# Patient Record
Sex: FEMALE | Race: WHITE | NOT HISPANIC OR LATINO | URBAN - METROPOLITAN AREA
[De-identification: names, ages, dates, MRNs, and addresses within clinical notes are randomized per-mention and may not be internally consistent; named-entity substitution may affect disease eponyms.]

---

## 2022-09-01 ENCOUNTER — EMERGENCY (EMERGENCY)
Facility: HOSPITAL | Age: 87
LOS: 1 days | Discharge: HOME | End: 2022-09-01
Attending: STUDENT IN AN ORGANIZED HEALTH CARE EDUCATION/TRAINING PROGRAM | Admitting: STUDENT IN AN ORGANIZED HEALTH CARE EDUCATION/TRAINING PROGRAM
Payer: MEDICARE

## 2022-09-01 VITALS
TEMPERATURE: 98 F | RESPIRATION RATE: 16 BRPM | HEART RATE: 68 BPM | DIASTOLIC BLOOD PRESSURE: 74 MMHG | OXYGEN SATURATION: 98 % | SYSTOLIC BLOOD PRESSURE: 135 MMHG

## 2022-09-01 VITALS
RESPIRATION RATE: 16 BRPM | SYSTOLIC BLOOD PRESSURE: 158 MMHG | HEIGHT: 63 IN | OXYGEN SATURATION: 98 % | TEMPERATURE: 98 F | WEIGHT: 119.93 LBS | DIASTOLIC BLOOD PRESSURE: 74 MMHG | HEART RATE: 77 BPM

## 2022-09-01 PROCEDURE — 99284 EMERGENCY DEPT VISIT MOD MDM: CPT | Mod: 25

## 2022-09-01 PROCEDURE — 70486 CT MAXILLOFACIAL W/O DYE: CPT | Mod: MA

## 2022-09-01 PROCEDURE — G0168: CPT

## 2022-09-01 PROCEDURE — 12011 RPR F/E/E/N/L/M 2.5 CM/<: CPT

## 2022-09-01 PROCEDURE — 70450 CT HEAD/BRAIN W/O DYE: CPT | Mod: 26,MA

## 2022-09-01 PROCEDURE — 90715 TDAP VACCINE 7 YRS/> IM: CPT

## 2022-09-01 PROCEDURE — 70486 CT MAXILLOFACIAL W/O DYE: CPT | Mod: 26,MA

## 2022-09-01 PROCEDURE — 70450 CT HEAD/BRAIN W/O DYE: CPT | Mod: MA

## 2022-09-01 PROCEDURE — 90471 IMMUNIZATION ADMIN: CPT

## 2022-09-01 RX ORDER — TETANUS TOXOID, REDUCED DIPHTHERIA TOXOID AND ACELLULAR PERTUSSIS VACCINE, ADSORBED 5; 2.5; 8; 8; 2.5 [IU]/.5ML; [IU]/.5ML; UG/.5ML; UG/.5ML; UG/.5ML
0.5 SUSPENSION INTRAMUSCULAR ONCE
Refills: 0 | Status: COMPLETED | OUTPATIENT
Start: 2022-09-01 | End: 2022-09-01

## 2022-09-01 RX ORDER — ACETAMINOPHEN 500 MG
2 TABLET ORAL
Qty: 0 | Refills: 0 | DISCHARGE

## 2022-09-01 RX ADMIN — TETANUS TOXOID, REDUCED DIPHTHERIA TOXOID AND ACELLULAR PERTUSSIS VACCINE, ADSORBED 0.5 MILLILITER(S): 5; 2.5; 8; 8; 2.5 SUSPENSION INTRAMUSCULAR at 12:56

## 2022-09-01 NOTE — ED ADULT TRIAGE NOTE - BP NONINVASIVE SYSTOLIC (MM HG)
Physical Therapy  Visit Type: initial evaluation  Precautions:  Medical precautions:  fall risk;.   Lines:     Basic: IV, O2 and telemetry (2L, external female catheter removed)      Lines in chart and on patient reviewed, cautions maintained throughout session.  Safety Measures: bed alarm and bed rails      SUBJECTIVE                                                                                                            Patient agreed to participate in therapy this date.  \"I don't want to fall!\"  \"I'm dizzy.\"  Patient / Family Goal: return to previous functional status, maximize function and return home    Pain   RN informed on pain level      OBJECTIVE                                                                                                                HR at rest 90's -100's; with activity 110's-130's  Dizziness reported with increased anxiety and fear of falling, HR increases during these brief episodes and decreased with breathing techniques.   Level of consciousness: alert      Disoriented to situation    Affect/Behavior: calm and cooperative  Functional Communication/Cognition    Overall status:  Within functional limits  Range of Motion (measured in degrees unless otherwise noted, active unless indicated)  WFL: RLE, LLE  Strength (out of 5 unless otherwise indicated)   Hip:  Hip Flexion: Left: 3- Right: 3-  Knee:   - Extension:      • Left: 3-      • Right: 3-  Ankle:    - Dorsiflexion:      • Left: 2      • Right: 2   - Plantar Flexion:      • Left: 2      • Right: 2  Balance    Sitting: Static: contact guard/touching/steadying assist and supervision double upper extremity support, double lower extremity support and back unsupported    Standing - Firm Surface - Eyes Open: Standing static: deferred due to dizziness.  Sensation - Lower Extremity  L1 (back, over trochanter and groin):     Light Touch: Left: intact Right: intact  L2 (back, front of thigh to knee):     Light Touch: Left: intact Right:  intact  L3 (back, upper buttock, anterior thigh, knee, medial lower leg):     Light Touch: Left: intact Right: intact  L4 (medial buttock, lateral thigh, medial leg, dorsum of foot, great toe):     Light Touch: Left: intact Right: intact  L5 (buttock, posterior and lateral thigh, lateral aspect of leg, dorsum of foot, medial half of sole, 1-3rd toes):     Light Touch: Left: intact Right: intact   S1 (buttock, thigh, posterior leg):     Light Touch: Left: intact Right: intact  Bed Mobility:    Rolling left: set up    Repositioning in bed: moderate assist and with verbal cues    Supine to sit: minimal assist and with verbal cues    Sit to supine: moderate assist and with verbal cues  Training completed:    Tasks: rolling left, boosting, supine to sit and sit to supine    Education details: body mechanics, patient safety and patient requires additional training  Transfers:      Sit to stand: not attempted due to safety concerns    Slide board: moderate assist (scooting along EOB)        Interventions                                                                                                       Seated    Lower Extremity: Bilateral: seated hip flexion, toe raises, heel raises and knee extensions, AROM, 10 reps, 1 sets  Training provided: activity tolerance, balance retraining, bed mobility training, breathing/relaxation, compensatory techniques, positioning and safety training    Skilled input: Verbal instruction/cues, tactile instruction/cues and posture correction  Verbal Consent: Writer verbally educated and received verbal consent for hand placement, positioning of patient, and techniques to be performed today from patient for clothing adjustments for techniques, therapist position for techniques and hand placement and palpation for techniques as described above and how they are pertinent to the patient's plan of care.        ASSESSMENT                                                                                                                 Impairments: strength, balance deficits, safety awareness, pain, activity tolerance and shortness of breath  Functional Limitations: all functional mobility  Pt anxiety and tachycardia seemed to go hand in hand, both calm with breathing exercises. With dangling pt reported fear of falling and dizziness. 's -130's at EOB, 90's-100's at rest. Will follow to progress as able. RN notified.       Discharge Recommendations   Recommendation for Discharge: PT IL: Patient needs daily, skilled therapy for 1-3 hours a day by at least two disciplines        PT/OT Mobility Equipment for Discharge: tbd      PT Identified Barriers to Discharge: dizziness, weakness, pain     Therapy Diagnosis:  Other Abnormalities of Gait and Mobility  Skilled therapy is required to address these limitations in attempt to maximize the patient's independence.  Predicted patient presentation: Moderate (evolving) - Patient comorbidities and complexities, as defined above, may have varying impact on steady progress for prescribed plan of care.    End of Session:   Location: in bed  Safety measures: alarm system in place/re-engaged, bed rails x3, call light within reach, equipment intact and lines intact  Handoff to: nurse            PLAN                                                                                                                            Suggestions for next session as indicated: PT Frequency: 3 days/week  A minimum of 8 minutes per session x 1 week.  Interventions: balance, bed mobility, body mechanics, compensatory technique education, energy conservation, functional transfer training, gait training, HEP train/position, patient/family training, safety education, stairs retraining and strengthening  Agreement to plan and goals: patient agrees with goals and treatment plan        GOALS:  Review Date: 10/21/2020  Long Term Goals: (to be met by time of discharge from hospital)  Sit to supine:  Patient will complete sit to supine set up.  Supine to sit: Patient will complete supine to sit set up.    Pt to demonstrate safe transfers OOB with AD and assist in order to progress to baseline mobility.Documented in the chart in the following areas: Prior Level of Function. Pain. Assessment. Patient Education.         158

## 2022-09-01 NOTE — ED PROVIDER NOTE - NSFOLLOWUPINSTRUCTIONS_ED_ALL_ED_FT
Please follow up with your Primary Care Physician and any specialists as discussed.  Please take your medications as prescribed.  If your symptoms persist or worsen, please seek care. Either return to the Emergency Department, go to urgent care or see your primary care doctor.  Please refer to general information and instructions below:       Skin Adhesive Care    WHAT YOU NEED TO KNOW:    Skin adhesive is medical glue used to close wounds. It is a substitute for staples and stitches. Skin adhesive wound closures take less time and do not require anesthesia. You have less pain and a lower risk of infection than with staples or stitches. Skin adhesive will fall off after the wound is healed.     DISCHARGE INSTRUCTIONS:    Self-care:   •Keep your wound clean and dry for 1 to 5 days. You can shower 24 hours after the skin adhesive is applied. Lightly pat your wound dry after you shower.      •Do not soak your wound in water, such as in a bath or hot tub.      •Do not scrub your wound or pick at the adhesive. This can make your wound reopen.       •Do not apply ointments to your wound. These include antibiotic and other ointments that contain petroleum jelly. These products will remove skin adhesive and reopen your wound.       Follow up with your doctor as directed: Write down your questions so you remember to ask them during your visits.    Contact your healthcare provider if:   •You have a fever.       •Your wound is red and warm to touch.       •You have questions or concerns about your condition or care.       Return to the emergency department if:   •Your wound has fluid draining from it.       •Your wound opens.

## 2022-09-01 NOTE — ED ADULT NURSE NOTE - NSIMPLEMENTINTERV_GEN_ALL_ED
Implemented All Fall with Harm Risk Interventions:  [place] to call system. Call bell, personal items and telephone within reach. Instruct patient to call for assistance. Room bathroom lighting operational. Non-slip footwear when patient is off stretcher. Physically safe environment: no spills, clutter or unnecessary equipment. Stretcher in lowest position, wheels locked, appropriate side rails in place. Provide visual cue, wrist band, yellow gown, etc. Monitor gait and stability. Monitor for mental status changes and reorient to person, place, and time. Review medications for side effects contributing to fall risk. Reinforce activity limits and safety measures with patient and family. Provide visual clues: red socks.

## 2022-09-01 NOTE — ED ADULT NURSE NOTE - CHIEF COMPLAINT QUOTE
Patient alert, BIBA from [facility] s/p trip and fall. Denies LOC. On ASA 81mg daily. Forehead laceration noted and ecchymosis under left eye.

## 2022-09-01 NOTE — ED PROVIDER NOTE - PATIENT PORTAL LINK FT
You can access the FollowMyHealth Patient Portal offered by [facility] by registering at the following website: [email or web address]. By joining [facility]’s FollowMyHealth portal, you will also be able to view your health information using other applications (apps) compatible with our system.

## 2022-09-01 NOTE — ED PROVIDER NOTE - PHYSICAL EXAMINATION
Vital signs as available reviewed.  General:  Comfortable, no acute distress.  Head:  Normocephalic, atraumatic.  Eyes:  Conjunctiva pink, no icterus. Pupils equal, round, reactive to light, extra-occular eye movements intact.  Cardiovascular:  Regular rate, no obvious murmur.  Respiratory:  Clear to auscultation, good air entry bilaterally.  Abdomen:  Soft, non-tender.  Musculoskeletal:  No tenderness to palpation over c/t/l spine. No tenderness to palpation over chest wall, clavicles, shoulders, upper/lower arms, hip/pelvis, upper / lower legs.  Neurologic: Alert and oriented, moving all extremities. Sensation intact.  Skin:  Warm and dry. healing laceration with sutures in place to left shin. 0.5 laceration to left forehead.

## 2022-09-01 NOTE — ED ADULT TRIAGE NOTE - CHIEF COMPLAINT QUOTE
Patient alert, BIBA from [place] [facility] s/p trip and fall. Denies LOC. On ASA 81mg daily. Forehead laceration noted and ecchymosis under left eye.

## 2022-09-01 NOTE — ED PROVIDER NOTE - NS ED ROS FT
Constitutional: No reported recent fever.  Neurological: No reported acute headache.  Eyes: No reported new vision changes.   Ears, Nose, Mouth, Throat: No reported acute sore throat.  Cardiovascular: No reported current chest pain.  Respiratory: No reported new shortness of breath.  Gastrointestinal: No reported vomiting.  Genitourinary: No reported new urinary problems.  Musculoskeletal: No reported acute extremity pain.  Integumentary (skin and/or breast): + laceration

## 2022-09-01 NOTE — ED PROVIDER NOTE - OBJECTIVE STATEMENT
87-year-old female history of hypertension, hyperlipidemia, dementia status post fall.  Patient was at a petting zoo put on by her nursing home ([facility]) when she tripped and fell.  Patient on aspirin.  Patient denies headache, nausea and vomiting, chest pain, shortness of breath.  Patient ambulatory after the incident.

## 2022-09-01 NOTE — ED ADULT NURSE NOTE - COVID-19 RESULT
Called for refills on Levothyroxine & atorvastatin, I see that there are pending lab orders in so I didn't know if he needed labs first or not?  
We can refill, but I don't know why he just did the stool cultures, but not the blood work so do call and tell him to come for the blood work. He usually is good for that. Can refill with #5 refills. Thanks.   
We can refill, but I don't know why he just did the stool cultures, but not the blood work so do call and tell him to come for the blood work. He usually is good for that. Can refill with #5 refills. Thanks.       Refilled as ordered,attempted to contact,voice mailbox is not set up,will attempt again later.      Letter mailed that he needs labs completed due to being unable to reach by phone.  
NEGATIVE

## 2022-09-01 NOTE — ED ADULT NURSE NOTE - OBJECTIVE STATEMENT
n/a
Presents to ER S/P fall.  Pt is pleasantly confused, but does recall falling.  Denies any pain. Lac to left side of forehead. Bruise under left eye.  EMS placed pt in neck collar.

## 2023-01-25 NOTE — ED PROVIDER NOTE - NSTOBACCOUNKNOWNSMK_GEN_A_CORE_RD
- Arrange for expedited RHC , possible leave in [place] with ICU admission for HF exacerbation. Cognitively Impaired

## 2024-02-11 ENCOUNTER — EMERGENCY (EMERGENCY)
Facility: HOSPITAL | Age: 89
LOS: 1 days | Discharge: NURSING, REHAB OR LONG-TERM CARE | End: 2024-02-11
Attending: EMERGENCY MEDICINE | Admitting: EMERGENCY MEDICINE
Payer: MEDICARE

## 2024-02-11 VITALS
OXYGEN SATURATION: 97 % | WEIGHT: 121.92 LBS | HEART RATE: 60 BPM | DIASTOLIC BLOOD PRESSURE: 87 MMHG | HEIGHT: 65 IN | SYSTOLIC BLOOD PRESSURE: 159 MMHG | RESPIRATION RATE: 20 BRPM | TEMPERATURE: 98 F

## 2024-02-11 VITALS
TEMPERATURE: 98 F | DIASTOLIC BLOOD PRESSURE: 75 MMHG | HEART RATE: 61 BPM | OXYGEN SATURATION: 97 % | RESPIRATION RATE: 18 BRPM | SYSTOLIC BLOOD PRESSURE: 148 MMHG

## 2024-02-11 PROBLEM — F03.90 UNSPECIFIED DEMENTIA WITHOUT BEHAVIORAL DISTURBANCE: Chronic | Status: ACTIVE | Noted: 2022-09-01

## 2024-02-11 PROBLEM — I10 ESSENTIAL (PRIMARY) HYPERTENSION: Chronic | Status: ACTIVE | Noted: 2022-09-01

## 2024-02-11 PROBLEM — E78.5 HYPERLIPIDEMIA, UNSPECIFIED: Chronic | Status: ACTIVE | Noted: 2022-09-01

## 2024-02-11 PROCEDURE — 99283 EMERGENCY DEPT VISIT LOW MDM: CPT | Mod: FS

## 2024-02-11 PROCEDURE — 99282 EMERGENCY DEPT VISIT SF MDM: CPT

## 2024-02-11 NOTE — ED PROVIDER NOTE - NSFOLLOWUPINSTRUCTIONS_ED_ALL_ED_FT
Follow-up with your PCP for reevaluation, ongoing care and treatment.  If having worsening symptoms, difficulty breathing, unable to swallow liquids or other related symptoms, return to the ER immediately.    Choking, Adult  Choking occurs when a food or object gets stuck in the throat or windpipe (trachea) and blocks the airway. If the airway is partly blocked, coughing will usually cause the food or object to come out. If the airway is completely blocked, immediate action is needed to make it come out.    A completely blocked airway can lead to respiratory failure and even death if untreated. The kind of treatment needed depends on the severity of the choking.    Signs of choking  A person with a partial airway blockage may be able to talk and cough.  A person with a complete airway blockage may:  Hold their neck with both hands. This is considered a universal sign of choking.  Be unable to breathe.  Make soft or high-pitched sounds while breathing.  Be unable to cough or cough weakly, ineffectively, or silently.  Be unable to cry, speak, or make sounds.  Turn blue or gray.  For partial airway blockage  If a person has a partial airway blockage and is coughing and able to speak:  Do not interfere. Allow coughing to clear the airway.  Do not let them try to drink until the food or object comes out.  Stay with the person until the food or object comes out. Watch for signs of complete airway blockage. If the person shows signs of complete airway blockage, take action to help them.  For complete airway blockage  A person doing abdominal thrusts on someone who is choking. Close-up shows how to press in and up with the hands.  If a person has a complete airway blockage, their life is in danger. A complete airway blockage causes breathing to stop. It is a medical emergency that requires fast and appropriate action by anyone who is available.    Do abdominal thrusts to save a person who is choking. Abdominal thrusts use pressure to force air from the abdomen into the throat to move the blockage.    CPR for an unconscious person  If the choking person is not breathing and either collapses or is found on the ground:  Shout for help.  If someone responds, tell that person to call 911 and look for an automated external defibrillator (AED).  If no one responds, begin 2 minutes of CPR.  Make sure the person is lying on a firm, flat surface and is facing up. Begin CPR, starting with 30 chest compressions and 2 breaths. Every time you open the airway to give rescue breaths, open the person's mouth. If you can see the food or object and it can be easily pulled out, remove it with your fingers. Do not try to remove the food or object if you cannot see it. This could push it farther into the airway.  After 5 cycles or 2 minutes of CPR, call local emergency services if a call has not already been made.  Continue CPR until the person starts breathing or until help arrives.  If you are choking:  A person using a chair to do abdominal thrusts.  Call 911. Do not worry about communicating what is happening. Do not hang up the phone. Someone may be sent to help you anyway.  Do abdominal thrusts on yourself. To do this, hold a fist against your abdomen and bend over a hard surface, such as a chair. Forcefully push your fist in and up until the food or object comes out.  Prevention  Chew food thoroughly.  Know that older adults are at an increased risk of choking. They should chew smaller bites and cut their food into smaller portions.  Avoid talking or laughing while you are chewing and swallowing.  Be prepared for choking situations. Take a certified first-aid course to learn how to correctly do abdominal thrusts.    Contact a health care provider if:  You have trouble swallowing food.  You continue to have drooling after choking stops.  You continue to have chest pain after choking stops.  Get help right away if:  You have problems breathing after choking stops.  You are confused or keep feeling drowsy.  You have lost consciousness at any point.  You were given CPR.  These symptoms may be an emergency. Get help right away. Call 911.  Do not wait to see if the symptoms will go away.  Do not drive yourself to the hospital.  This information is not intended to replace advice given to you by your health care provider. Make sure you discuss any questions you have with your health care provider.

## 2024-02-11 NOTE — ED PROVIDER NOTE - ENMT, MLM
Airway patent. Mouth with normal mucosa. Throat has no vesicles, no oropharyngeal exudates and uvula is midline. Swallowing secretions and water without difficulty now, no stridor or drooling noted.

## 2024-02-11 NOTE — ED PROVIDER NOTE - PATIENT PORTAL LINK FT
You can access the FollowMyHealth Patient Portal offered by [facility] by registering at the following website: [email or web address]. By joining [employer]’s FollowMyHealth portal, you will also be able to view your health information using other applications (apps) compatible with our system.

## 2024-02-11 NOTE — ED PROVIDER NOTE - PROGRESS NOTE DETAILS
pt swallowed a whole glass of water in ED without any difficulty. will dc back to facility. no further intervention warranted at this time.

## 2024-02-11 NOTE — ED PROVIDER NOTE - CLINICAL SUMMARY MEDICAL DECISION MAKING FREE TEXT BOX
88-year-old female with dementia brought from [facility] for evaluation of choking episode after she swallowed a hard candy.  Patient states she feels fine and is unable to give further history.    Physical exam vital signs stable afebrile no distress.  Oral airway is clear.  There is no stridor.  Lungs are clear.  Heart S1-S2 regular rhythm.  Abdomen soft nontender no mass or HSM.  Extremities full range of motion intact no edema.  Neuro intact confused but pleasant and cooperative.  No focal deficits.  Impression is choking spell which has resolved.  Patient drink full glass of water in ER without difficulty.  No indication for further intervention.  Plan is to discharge back to [facility].

## 2024-02-11 NOTE — ED ADULT NURSE NOTE - NSFALLHARMRISKINTERV_ED_ALL_ED
Assistance OOB with selected safe patient handling equipment if applicable/Communicate risk of Fall with Harm to all staff, patient, and family/Monitor gait and stability/Monitor for mental status changes and reorient to person, place, and time, as needed/Move patient closer to nursing station/within visual sight of ED staff/Provide patient with walking aids/Provide visual cue: red socks, yellow wristband, yellow gown, etc/Reinforce activity limits and safety measures with patient and family/Toileting schedule using arm’s reach rule for commode and bathroom/Use of alarms - bed, stretcher, chair and/or video monitoring/Bed in lowest position, wheels locked, appropriate side rails in place/Call bell, personal items and telephone in reach/Instruct patient to call for assistance before getting out of bed/chair/stretcher/Non-slip footwear applied when patient is off stretcher/[place] to call system/Physically safe environment - no spills, clutter or unnecessary equipment/Purposeful Proactive Rounding/Room/bathroom lighting operational, light cord in reach

## 2024-02-11 NOTE — ED PROVIDER NOTE - OBJECTIVE STATEMENT
88-year-old female with history of dementia, hypertension brought in by ambulance from [facility] for evaluation of difficulty swallowing/choking episode after she swallowed a hard candy today.  Denies shortness of breath, vomiting.  Patient states that she feels fine now and better than she did before.  Unable to obtain further history secondary to dementia.

## 2024-02-11 NOTE — ED ADULT NURSE NOTE - OBJECTIVE STATEMENT
pt AOx1 hx of dementia sent from [facility] for chocking on piece of hard candy. Pt is able to tolerate PO fluids in the ED. passed dysphagia screen. pt denies SOB. no obvious signs of breathing issues noted. no drooling noted. pt appears comfortable and states she feels better now then she did before.

## 2024-02-11 NOTE — ED PROVIDER NOTE - CARE PROVIDER_API CALL
Brown Whitt  [address] [address]  Phone: [phone]  Fax: [phone]  Follow Up Time: 1-3 Days

## 2024-05-29 ENCOUNTER — INPATIENT (INPATIENT)
Facility: HOSPITAL | Age: 89
LOS: 2 days | Discharge: NURSING, REHAB OR LONG-TERM CARE | DRG: 871 | End: 2024-06-01
Attending: INTERNAL MEDICINE | Admitting: INTERNAL MEDICINE
Payer: MEDICARE

## 2024-05-29 VITALS
TEMPERATURE: 100 F | HEIGHT: 65 IN | WEIGHT: 121.03 LBS | SYSTOLIC BLOOD PRESSURE: 141 MMHG | HEART RATE: 108 BPM | OXYGEN SATURATION: 94 % | RESPIRATION RATE: 22 BRPM | DIASTOLIC BLOOD PRESSURE: 87 MMHG

## 2024-05-29 DIAGNOSIS — N39.0 URINARY TRACT INFECTION, SITE NOT SPECIFIED: ICD-10-CM

## 2024-05-29 LAB
ALBUMIN SERPL ELPH-MCNC: 2.3 G/DL — LOW (ref 3.3–5)
ALP SERPL-CCNC: 81 U/L — SIGNIFICANT CHANGE UP (ref 30–120)
ALT FLD-CCNC: 28 U/L — SIGNIFICANT CHANGE UP (ref 10–60)
ANION GAP SERPL CALC-SCNC: 11 MMOL/L — SIGNIFICANT CHANGE UP (ref 5–17)
APPEARANCE UR: ABNORMAL
APTT BLD: 26.5 SEC — SIGNIFICANT CHANGE UP (ref 24.5–35.6)
AST SERPL-CCNC: 63 U/L — HIGH (ref 10–40)
BACTERIA # UR AUTO: ABNORMAL /HPF
BASOPHILS # BLD AUTO: 0 K/UL — SIGNIFICANT CHANGE UP (ref 0–0.2)
BASOPHILS NFR BLD AUTO: 0 % — SIGNIFICANT CHANGE UP (ref 0–2)
BILIRUB SERPL-MCNC: 0.9 MG/DL — SIGNIFICANT CHANGE UP (ref 0.2–1.2)
BILIRUB UR-MCNC: ABNORMAL
BUN SERPL-MCNC: 44 MG/DL — HIGH (ref 7–23)
CALCIUM SERPL-MCNC: 9.7 MG/DL — SIGNIFICANT CHANGE UP (ref 8.4–10.5)
CHLORIDE SERPL-SCNC: 98 MMOL/L — SIGNIFICANT CHANGE UP (ref 96–108)
CO2 SERPL-SCNC: 26 MMOL/L — SIGNIFICANT CHANGE UP (ref 22–31)
COLOR SPEC: ABNORMAL
CREAT SERPL-MCNC: 1.72 MG/DL — HIGH (ref 0.5–1.3)
DIFF PNL FLD: ABNORMAL
EGFR: 28 ML/MIN/1.73M2 — LOW
EOSINOPHIL # BLD AUTO: 0.17 K/UL — SIGNIFICANT CHANGE UP (ref 0–0.5)
EOSINOPHIL NFR BLD AUTO: 1 % — SIGNIFICANT CHANGE UP (ref 0–6)
EPI CELLS # UR: PRESENT
FLUAV AG NPH QL: SIGNIFICANT CHANGE UP
FLUBV AG NPH QL: SIGNIFICANT CHANGE UP
GLUCOSE SERPL-MCNC: 158 MG/DL — HIGH (ref 70–99)
GLUCOSE UR QL: NEGATIVE MG/DL — SIGNIFICANT CHANGE UP
HCT VFR BLD CALC: 40.4 % — SIGNIFICANT CHANGE UP (ref 34.5–45)
HGB BLD-MCNC: 12.6 G/DL — SIGNIFICANT CHANGE UP (ref 11.5–15.5)
INR BLD: 1.06 RATIO — SIGNIFICANT CHANGE UP (ref 0.85–1.18)
KETONES UR-MCNC: SIGNIFICANT CHANGE UP MG/DL
LACTATE SERPL-SCNC: 2.1 MMOL/L — HIGH (ref 0.7–2)
LACTATE SERPL-SCNC: 2.5 MMOL/L — HIGH (ref 0.7–2)
LEUKOCYTE ESTERASE UR-ACNC: ABNORMAL
LYMPHOCYTES # BLD AUTO: 0.34 K/UL — LOW (ref 1–3.3)
LYMPHOCYTES # BLD AUTO: 2 % — LOW (ref 13–44)
MANUAL SMEAR VERIFICATION: SIGNIFICANT CHANGE UP
MCHC RBC-ENTMCNC: 27.1 PG — SIGNIFICANT CHANGE UP (ref 27–34)
MCHC RBC-ENTMCNC: 31.2 GM/DL — LOW (ref 32–36)
MCV RBC AUTO: 86.9 FL — SIGNIFICANT CHANGE UP (ref 80–100)
MONOCYTES # BLD AUTO: 0.34 K/UL — SIGNIFICANT CHANGE UP (ref 0–0.9)
MONOCYTES NFR BLD AUTO: 2 % — SIGNIFICANT CHANGE UP (ref 2–14)
NEUTROPHILS # BLD AUTO: 16.33 K/UL — HIGH (ref 1.8–7.4)
NEUTROPHILS NFR BLD AUTO: 89 % — HIGH (ref 43–77)
NEUTS BAND # BLD: 6 % — SIGNIFICANT CHANGE UP (ref 0–8)
NITRITE UR-MCNC: POSITIVE
NRBC # BLD: 0 /100 WBCS — SIGNIFICANT CHANGE UP (ref 0–0)
NRBC # BLD: SIGNIFICANT CHANGE UP /100 WBCS (ref 0–0)
OB PNL STL: POSITIVE
PH UR: 6 — SIGNIFICANT CHANGE UP (ref 5–8)
PLAT MORPH BLD: NORMAL — SIGNIFICANT CHANGE UP
PLATELET # BLD AUTO: 355 K/UL — SIGNIFICANT CHANGE UP (ref 150–400)
POTASSIUM SERPL-MCNC: 6 MMOL/L — HIGH (ref 3.5–5.3)
POTASSIUM SERPL-SCNC: 6 MMOL/L — HIGH (ref 3.5–5.3)
PROT SERPL-MCNC: 7 G/DL — SIGNIFICANT CHANGE UP (ref 6–8.3)
PROT UR-MCNC: 30 MG/DL
PROTHROM AB SERPL-ACNC: 11.5 SEC — SIGNIFICANT CHANGE UP (ref 9.5–13)
RBC # BLD: 4.65 M/UL — SIGNIFICANT CHANGE UP (ref 3.8–5.2)
RBC # FLD: 14.7 % — HIGH (ref 10.3–14.5)
RBC BLD AUTO: NORMAL — SIGNIFICANT CHANGE UP
RBC CASTS # UR COMP ASSIST: ABNORMAL /HPF
RSV RNA NPH QL NAA+NON-PROBE: SIGNIFICANT CHANGE UP
SARS-COV-2 RNA SPEC QL NAA+PROBE: SIGNIFICANT CHANGE UP
SODIUM SERPL-SCNC: 135 MMOL/L — SIGNIFICANT CHANGE UP (ref 135–145)
SP GR SPEC: 1.03 — HIGH (ref 1–1.03)
UROBILINOGEN FLD QL: 0.2 MG/DL — SIGNIFICANT CHANGE UP (ref 0.2–1)
WBC # BLD: 17.19 K/UL — HIGH (ref 3.8–10.5)
WBC # FLD AUTO: 17.19 K/UL — HIGH (ref 3.8–10.5)
WBC UR QL: 10 /HPF — HIGH (ref 0–5)

## 2024-05-29 PROCEDURE — 93010 ELECTROCARDIOGRAM REPORT: CPT

## 2024-05-29 PROCEDURE — 71045 X-RAY EXAM CHEST 1 VIEW: CPT | Mod: 26

## 2024-05-29 PROCEDURE — 99285 EMERGENCY DEPT VISIT HI MDM: CPT

## 2024-05-29 PROCEDURE — 71250 CT THORAX DX C-: CPT | Mod: 26,MC

## 2024-05-29 PROCEDURE — 74176 CT ABD & PELVIS W/O CONTRAST: CPT | Mod: 26,MC

## 2024-05-29 RX ORDER — SODIUM CHLORIDE 9 MG/ML
1700 INJECTION INTRAMUSCULAR; INTRAVENOUS; SUBCUTANEOUS ONCE
Refills: 0 | Status: COMPLETED | OUTPATIENT
Start: 2024-05-29 | End: 2024-05-29

## 2024-05-29 RX ORDER — PIPERACILLIN AND TAZOBACTAM 4; .5 G/20ML; G/20ML
3.38 INJECTION, POWDER, LYOPHILIZED, FOR SOLUTION INTRAVENOUS EVERY 12 HOURS
Refills: 0 | Status: DISCONTINUED | OUTPATIENT
Start: 2024-05-30 | End: 2024-05-30

## 2024-05-29 RX ORDER — LACTULOSE 10 G/15ML
30 SOLUTION ORAL DAILY
Refills: 0 | Status: DISCONTINUED | OUTPATIENT
Start: 2024-05-29 | End: 2024-05-30

## 2024-05-29 RX ORDER — SODIUM CHLORIDE 9 MG/ML
1000 INJECTION, SOLUTION INTRAVENOUS
Refills: 0 | Status: DISCONTINUED | OUTPATIENT
Start: 2024-05-29 | End: 2024-05-30

## 2024-05-29 RX ORDER — ACETAMINOPHEN 500 MG
1000 TABLET ORAL ONCE
Refills: 0 | Status: COMPLETED | OUTPATIENT
Start: 2024-05-29 | End: 2024-05-29

## 2024-05-29 RX ORDER — PANTOPRAZOLE SODIUM 20 MG/1
40 TABLET, DELAYED RELEASE ORAL
Refills: 0 | Status: DISCONTINUED | OUTPATIENT
Start: 2024-05-29 | End: 2024-05-30

## 2024-05-29 RX ORDER — PIPERACILLIN AND TAZOBACTAM 4; .5 G/20ML; G/20ML
3.38 INJECTION, POWDER, LYOPHILIZED, FOR SOLUTION INTRAVENOUS ONCE
Refills: 0 | Status: COMPLETED | OUTPATIENT
Start: 2024-05-29 | End: 2024-05-29

## 2024-05-29 RX ORDER — SODIUM CHLORIDE 9 MG/ML
1000 INJECTION INTRAMUSCULAR; INTRAVENOUS; SUBCUTANEOUS ONCE
Refills: 0 | Status: COMPLETED | OUTPATIENT
Start: 2024-05-29 | End: 2024-05-29

## 2024-05-29 RX ADMIN — SODIUM CHLORIDE 1700 MILLILITER(S): 9 INJECTION INTRAMUSCULAR; INTRAVENOUS; SUBCUTANEOUS at 16:08

## 2024-05-29 RX ADMIN — PIPERACILLIN AND TAZOBACTAM 200 GRAM(S): 4; .5 INJECTION, POWDER, LYOPHILIZED, FOR SOLUTION INTRAVENOUS at 08:40

## 2024-05-29 RX ADMIN — SODIUM CHLORIDE 1700 MILLILITER(S): 9 INJECTION INTRAMUSCULAR; INTRAVENOUS; SUBCUTANEOUS at 08:40

## 2024-05-29 RX ADMIN — Medication 1000 MILLIGRAM(S): at 09:00

## 2024-05-29 RX ADMIN — SODIUM CHLORIDE 1000 MILLILITER(S): 9 INJECTION INTRAMUSCULAR; INTRAVENOUS; SUBCUTANEOUS at 16:09

## 2024-05-29 RX ADMIN — PIPERACILLIN AND TAZOBACTAM 25 GRAM(S): 4; .5 INJECTION, POWDER, LYOPHILIZED, FOR SOLUTION INTRAVENOUS at 15:13

## 2024-05-29 RX ADMIN — Medication 400 MILLIGRAM(S): at 08:40

## 2024-05-29 NOTE — PATIENT PROFILE ADULT - FALL HARM RISK - RISK INTERVENTIONS
Assistance OOB with selected safe patient handling equipment/Assistance with ambulation/Communicate Fall Risk and Risk Factors to all staff, patient, and family/Discuss with provider need for PT consult/Monitor for mental status changes/Monitor gait and stability/Move patient closer to nurses' station/Provide patient with walking aids - walker, cane, crutches/Reinforce activity limits and safety measures with patient and family/Reorient to person, place and time as needed/Toileting schedule using arm’s reach rule for commode and bathroom/Use of alarms - bed, chair and/or voice tab/Visual Cue: Yellow wristband/Bed in lowest position, wheels locked, appropriate side rails in place/Call bell, personal items and telephone in reach/Instruct patient to call for assistance before getting out of bed or chair/Non-slip footwear when patient is out of bed/[place] to call system/Physically safe environment - no spills, clutter or unnecessary equipment/Purposeful Proactive Rounding/Room/bathroom lighting operational, light cord in reach

## 2024-05-29 NOTE — ED PROVIDER NOTE - DIFFERENTIAL DIAGNOSIS
Differential including but not limited to sepsis pneumonia COVID enteritis colitis diverticulitis question abnormality dehydration anemia Differential Diagnosis

## 2024-05-29 NOTE — ED ADULT NURSE NOTE - CHIEF COMPLAINT QUOTE
as per EMS crew  " She is from [facility], the staff reported found her diaper saturated with blood this morning " PMH Dementia Hypothyroidism TIA  - Pt is poor historian (+) Skin tear on the right forearm - dressing in place PT is DNR DNI

## 2024-05-29 NOTE — PATIENT PROFILE ADULT - FALL HARM RISK - TYPE OF ASSESSMENT
Orders signed.  
Pt is scheduled for her initial ob visit with you on 06/28. Her LMP was 04/01/23. I have attached an order for ultrasound and bhcg.  
Admission

## 2024-05-29 NOTE — CONSULT NOTE ADULT - ASSESSMENT
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

## 2024-05-29 NOTE — ED PROVIDER NOTE - CLINICAL SUMMARY MEDICAL DECISION MAKING FREE TEXT BOX
Patient brought in by EMS from [facility] for rectal bleeding.  Patient unable to provide history secondary to dementia.  No further history available  PMD Jose Eduardo    Plan patient febrile upon triage with abdominal tenderness we will get EKG chest x-ray labs CT chest abdomen pelvis IV fluids Zosyn Ofirmev    Differential including but not limited to sepsis pneumonia COVID enteritis colitis diverticulitis question abnormality dehydration anemia

## 2024-05-29 NOTE — ED PROVIDER NOTE - CONSTITUTIONAL, MLM
NN spoke with pt at BS.  Pt alert and able to answer questions appropriately. Pt O2 sat  93% on  3.5L currently, home O2 use 2 L HS with CPAP. Discussion about outpatient pulmonary rehab options.  COPD action plan reviewed. Deep breathing exercises encouraged.  IS and flutter valve to BS. Instruction given.  No new concerns or questions voiced at this time.  NN will continue to follow as needed.     Per current GOLD Standards, please consider: Complete asthma regimen in place per pulmonary notes, Outpatient PFT, Pulmonary rehab as appropriate   Well appearing, awake, alert, oriented to person, and in no apparent distress. normal...

## 2024-05-29 NOTE — ED ADULT NURSE NOTE - OBJECTIVE STATEMENT
89 y/o female received awake via stretcher, bibems from [facility] for fever and blood in diaper eval. unsure during initial assessment whether bleeding is vaginal vs rectal. pt currently non verbal, UTO hx from pt at this time, placed on cardiac monitor, IVL inserted, bloods drawn and sent to lab.

## 2024-05-29 NOTE — CONSULT NOTE ADULT - SUBJECTIVE AND OBJECTIVE BOX
Chief Complaint:  Patient is a 89y old  Female who presents with a chief complaint of brbpr had an episode of hematuria now found to have a gastric mass in the stomach  · Chief Complaint: The patient is a 89y Female complaining of rectal bleeding.  · Unable to Obtain: Dementia  · HPI Objective Statement: Patient brought in by EMS from [facility] for rectal bleeding.  Patient unable to provide history secondary to dementia.  No further history available  PMD Jose Eduardo      Allergies:  No Known Allergies      Medications:  bisacodyl Suppository 10 milliGRAM(s) Rectal daily PRN  lactated ringers. 1000 milliLiter(s) IV Continuous <Continuous>  lactulose Syrup 30 Gram(s) Oral daily  pantoprazole    Tablet 40 milliGRAM(s) Oral before breakfast  piperacillin/tazobactam IVPB.. 3.375 Gram(s) IV Intermittent every 8 hours      PMHX/PSHX:  Dementia    HTN (hypertension)    HLD (hyperlipidemia)        Family history:    no uc no cd from the chart    Social History:     ROS:   snf residnet no etoh no cigs no ivda    General:  No wt loss, fevers, chills, night sweats, fatigue,   Eyes:  Good vision, no reported pain  ENT:  No sore throat, pain, runny nose, dysphagia  CV:  No pain, palpitations, hypo/hypertension  Resp:  No dyspnea, cough, tachypnea, wheezing  GI:  No pain, No nausea, No vomiting, No diarrhea, No constipation, No weight loss, No fever, No pruritis, No rectal bleeding, No tarry stools, No dysphagia,  :  No pain, bleeding, incontinence, nocturia  Muscle:  No pain, weakness  Neuro:  No weakness, tingling, memory problems  Psych:  No fatigue, insomnia, mood problems, depression  Endocrine:  No polyuria, polydipsia, cold/heat intolerance  Heme:  No petechiae, ecchymosis, easy bruisability  Skin:  No rash, tattoos, scars, edema      PHYSICAL EXAM:   Vital Signs:  Vital Signs Last 24 Hrs  T(C): 37.9 (29 May 2024 08:03), Max: 37.9 (29 May 2024 08:03)  T(F): 100.2 (29 May 2024 08:03), Max: 100.2 (29 May 2024 08:03)  HR: 108 (29 May 2024 08:03) (108 - 108)  BP: 141/87 (29 May 2024 08:03) (141/87 - 141/87)  BP(mean): --  RR: 22 (29 May 2024 08:03) (22 - 22)  SpO2: 94% (29 May 2024 08:03) (94% - 94%)    Parameters below as of 29 May 2024 08:03  Patient On (Oxygen Delivery Method): room air      Daily Height in cm: 165.1 (29 May 2024 08:03)    Daily     GENERAL:  Appears stated age, well-groomed, well-nourished, no distress  HEENT:  NC/AT,  conjunctivae clear and pink, no thyromegaly, nodules, adenopathy, no JVD, sclera -anicteric  CHEST:  Full & symmetric excursion, no increased effort, breath sounds clear  HEART:  Regular rhythm, S1, S2, no murmur/rub/S3/S4, no abdominal bruit, no edema  ABDOMEN:  Soft, non-tender, non-distended, normoactive bowel sounds,  no masses ,no hepato-splenomegaly, no signs of chronic liver disease  EXTEREMITIES:  no cyanosis,clubbing or edema  SKIN:  No rash/erythema/ecchymoses/petechiae/wounds/abscess/warm/dry  NEURO:  Alert, oriented, no asterixis, no tremor, no encephalopathy    LABS:                        12.6   17.19 )-----------( 355      ( 29 May 2024 08:30 )             40.4     05    135  |  98  |  44<H>  ----------------------------<  158<H>  6.0<H>   |  26  |  1.72<H>    Ca    9.7      29 May 2024 08:30    TPro  7.0  /  Alb  2.3<L>  /  TBili  0.9  /  DBili  x   /  AST  63<H>  /  ALT  28  /  AlkPhos  81  05-29    LIVER FUNCTIONS - ( 29 May 2024 08:30 )  Alb: 2.3 g/dL / Pro: 7.0 g/dL / ALK PHOS: 81 U/L / ALT: 28 U/L / AST: 63 U/L / GGT: x           PT/INR - ( 29 May 2024 08:30 )   PT: 11.5 sec;   INR: 1.06 ratio         PTT - ( 29 May 2024 08:30 )  PTT:26.5 sec  Urinalysis Basic - ( 29 May 2024 08:50 )    Color: Red / Appearance: Turbid / S.031 / pH: x  Gluc: x / Ketone: see note mg/dL  / Bili: Small / Urobili: 0.2 mg/dL   Blood: x / Protein: 30 mg/dL / Nitrite: Positive   Leuk Esterase: Large / RBC: Too Numerous to count /HPF / WBC 10 /HPF   Sq Epi: x / Non Sq Epi: x / Bacteria: Many /HPF          Imaging:

## 2024-05-29 NOTE — ED PROVIDER NOTE - PROGRESS NOTE DETAILS
Discussed with patient's son he agrees with plan for admission discussed with Dr. Cleveland (attending medicine) she will admit patient

## 2024-05-29 NOTE — ED ADULT TRIAGE NOTE - CHIEF COMPLAINT QUOTE
as per EMS crew  " She is from [facility], the staff reported found her diaper saturated with blood this morning " PMH Dementia Hypothyroidism TIA  - Pt is poor historian (+) Skin tear on the right forearm - dressing in place PT is DNR DNI

## 2024-05-29 NOTE — ED PROVIDER NOTE - OBJECTIVE STATEMENT
Patient brought in by EMS from [facility] for rectal bleeding.  Patient unable to provide history secondary to dementia.  No further history available  GREGG Whitt

## 2024-05-30 DIAGNOSIS — Z71.89 OTHER SPECIFIED COUNSELING: ICD-10-CM

## 2024-05-30 DIAGNOSIS — K31.89 OTHER DISEASES OF STOMACH AND DUODENUM: ICD-10-CM

## 2024-05-30 DIAGNOSIS — F03.90 UNSPECIFIED DEMENTIA, UNSPECIFIED SEVERITY, WITHOUT BEHAVIORAL DISTURBANCE, PSYCHOTIC DISTURBANCE, MOOD DISTURBANCE, AND ANXIETY: ICD-10-CM

## 2024-05-30 DIAGNOSIS — Z51.5 ENCOUNTER FOR PALLIATIVE CARE: ICD-10-CM

## 2024-05-30 LAB
ANION GAP SERPL CALC-SCNC: 10 MMOL/L — SIGNIFICANT CHANGE UP (ref 5–17)
BASOPHILS # BLD AUTO: 0.04 K/UL — SIGNIFICANT CHANGE UP (ref 0–0.2)
BASOPHILS NFR BLD AUTO: 0.2 % — SIGNIFICANT CHANGE UP (ref 0–2)
BUN SERPL-MCNC: 31 MG/DL — HIGH (ref 7–23)
CALCIUM SERPL-MCNC: 8.6 MG/DL — SIGNIFICANT CHANGE UP (ref 8.4–10.5)
CHLORIDE SERPL-SCNC: 105 MMOL/L — SIGNIFICANT CHANGE UP (ref 96–108)
CO2 SERPL-SCNC: 25 MMOL/L — SIGNIFICANT CHANGE UP (ref 22–31)
CREAT SERPL-MCNC: 1.48 MG/DL — HIGH (ref 0.5–1.3)
EGFR: 34 ML/MIN/1.73M2 — LOW
EOSINOPHIL # BLD AUTO: 0.05 K/UL — SIGNIFICANT CHANGE UP (ref 0–0.5)
EOSINOPHIL NFR BLD AUTO: 0.2 % — SIGNIFICANT CHANGE UP (ref 0–6)
GLUCOSE SERPL-MCNC: 150 MG/DL — HIGH (ref 70–99)
HCT VFR BLD CALC: 32.6 % — LOW (ref 34.5–45)
HGB BLD-MCNC: 10.2 G/DL — LOW (ref 11.5–15.5)
IMM GRANULOCYTES NFR BLD AUTO: 1 % — HIGH (ref 0–0.9)
LYMPHOCYTES # BLD AUTO: 0.56 K/UL — LOW (ref 1–3.3)
LYMPHOCYTES # BLD AUTO: 2.3 % — LOW (ref 13–44)
MCHC RBC-ENTMCNC: 27.1 PG — SIGNIFICANT CHANGE UP (ref 27–34)
MCHC RBC-ENTMCNC: 31.3 GM/DL — LOW (ref 32–36)
MCV RBC AUTO: 86.7 FL — SIGNIFICANT CHANGE UP (ref 80–100)
MONOCYTES # BLD AUTO: 0.71 K/UL — SIGNIFICANT CHANGE UP (ref 0–0.9)
MONOCYTES NFR BLD AUTO: 3 % — SIGNIFICANT CHANGE UP (ref 2–14)
NEUTROPHILS # BLD AUTO: 22.42 K/UL — HIGH (ref 1.8–7.4)
NEUTROPHILS NFR BLD AUTO: 93.3 % — HIGH (ref 43–77)
NRBC # BLD: 0 /100 WBCS — SIGNIFICANT CHANGE UP (ref 0–0)
PLATELET # BLD AUTO: 356 K/UL — SIGNIFICANT CHANGE UP (ref 150–400)
POTASSIUM SERPL-MCNC: 4.5 MMOL/L — SIGNIFICANT CHANGE UP (ref 3.5–5.3)
POTASSIUM SERPL-SCNC: 4.5 MMOL/L — SIGNIFICANT CHANGE UP (ref 3.5–5.3)
RBC # BLD: 3.76 M/UL — LOW (ref 3.8–5.2)
RBC # FLD: 15.1 % — HIGH (ref 10.3–14.5)
SODIUM SERPL-SCNC: 140 MMOL/L — SIGNIFICANT CHANGE UP (ref 135–145)
WBC # BLD: 24.03 K/UL — HIGH (ref 3.8–10.5)
WBC # FLD AUTO: 24.03 K/UL — HIGH (ref 3.8–10.5)

## 2024-05-30 PROCEDURE — 99223 1ST HOSP IP/OBS HIGH 75: CPT

## 2024-05-30 RX ORDER — SENNA PLUS 8.6 MG/1
2 TABLET ORAL AT BEDTIME
Refills: 0 | Status: DISCONTINUED | OUTPATIENT
Start: 2024-05-30 | End: 2024-06-01

## 2024-05-30 RX ORDER — DONEPEZIL HYDROCHLORIDE 10 MG/1
10 TABLET, FILM COATED ORAL AT BEDTIME
Refills: 0 | Status: DISCONTINUED | OUTPATIENT
Start: 2024-05-30 | End: 2024-06-01

## 2024-05-30 RX ORDER — MORPHINE SULFATE 50 MG/1
5 CAPSULE, EXTENDED RELEASE ORAL EVERY 4 HOURS
Refills: 0 | Status: DISCONTINUED | OUTPATIENT
Start: 2024-05-30 | End: 2024-06-01

## 2024-05-30 RX ORDER — PANTOPRAZOLE SODIUM 20 MG/1
40 TABLET, DELAYED RELEASE ORAL
Refills: 0 | Status: DISCONTINUED | OUTPATIENT
Start: 2024-05-30 | End: 2024-06-01

## 2024-05-30 RX ORDER — HYDROMORPHONE HYDROCHLORIDE 2 MG/ML
1 INJECTION INTRAMUSCULAR; INTRAVENOUS; SUBCUTANEOUS EVERY 4 HOURS
Refills: 0 | Status: DISCONTINUED | OUTPATIENT
Start: 2024-05-30 | End: 2024-05-30

## 2024-05-30 RX ORDER — POLYETHYLENE GLYCOL 3350 17 G/17G
17 POWDER, FOR SOLUTION ORAL DAILY
Refills: 0 | Status: DISCONTINUED | OUTPATIENT
Start: 2024-05-30 | End: 2024-06-01

## 2024-05-30 RX ORDER — ATROPINE SULFATE 1 %
2 DROPS OPHTHALMIC (EYE) EVERY 6 HOURS
Refills: 0 | Status: DISCONTINUED | OUTPATIENT
Start: 2024-05-30 | End: 2024-06-01

## 2024-05-30 RX ADMIN — PANTOPRAZOLE SODIUM 40 MILLIGRAM(S): 20 TABLET, DELAYED RELEASE ORAL at 06:24

## 2024-05-30 RX ADMIN — SENNA PLUS 2 TABLET(S): 8.6 TABLET ORAL at 22:01

## 2024-05-30 RX ADMIN — PANTOPRAZOLE SODIUM 40 MILLIGRAM(S): 20 TABLET, DELAYED RELEASE ORAL at 12:59

## 2024-05-30 RX ADMIN — DONEPEZIL HYDROCHLORIDE 10 MILLIGRAM(S): 10 TABLET, FILM COATED ORAL at 22:01

## 2024-05-30 RX ADMIN — PANTOPRAZOLE SODIUM 40 MILLIGRAM(S): 20 TABLET, DELAYED RELEASE ORAL at 17:10

## 2024-05-30 RX ADMIN — PIPERACILLIN AND TAZOBACTAM 25 GRAM(S): 4; .5 INJECTION, POWDER, LYOPHILIZED, FOR SOLUTION INTRAVENOUS at 03:15

## 2024-05-30 RX ADMIN — POLYETHYLENE GLYCOL 3350 17 GRAM(S): 17 POWDER, FOR SOLUTION ORAL at 17:10

## 2024-05-30 NOTE — CONSULT NOTE ADULT - CONVERSATION DETAILS
Discussed with son on this date  introduced self and role  reviewed clinical scenario including conversations held with providers previously i.e. plan to focus on comfort  reviewed inpatient hospice criteria (IV ATC meds for uncontrolled symptom- which patient currently doesn't meet) vs. CMO in hospital vs. CMO at LTC facility ([facility]).  Son amenable to d/c back to [facility] with comfort care  agreed to no further blood draws, finger sticks, iv abx, artificial nutrition, and DNH.  DNR/DNI previously in place. SW to f/u re: dispo planning

## 2024-05-30 NOTE — GOALS OF CARE CONVERSATION - ADVANCED CARE PLANNING - NS PRO AD PATIENT TYPE
Do Not Resuscitate (DNR)/Medical Orders for Life-Sustaining Treatment (MOLST)
Health Care Proxy (HCP)
Do Not Resuscitate (DNR)/Medical Orders for Life-Sustaining Treatment (MOLST)

## 2024-05-30 NOTE — CONSULT NOTE ADULT - SUBJECTIVE AND OBJECTIVE BOX
HPI:  89YO F resident of [facility] Dementia HTN, HLD who presented to the hospital with c/o rectal bleeding noted at NH. Pt unable to provide history., In ED Tmax 100.2 WBC 17K with bandemia. Noted mohamud. Ua with pyuria,     Infectious Disease consult was called to evaluate pt and for antibiotic management.      Past Medical & Surgical Hx:  PAST MEDICAL & SURGICAL HISTORY:  Dementia  HTN (hypertension)  HLD (hyperlipidemia)      Social History--  EtOH: denies   Tobacco: denies   Drug Use: denies       FAMILY HISTORY:  Noncontributory    Allergies  No Known Allergies    Intolerances  NONE      Home Medications:  acetaminophen 325 mg oral tablet: 2 tab(s) orally every 6 hours, As Needed (01 Sep 2022 13:11)  Aspirin Enteric Coated 81 mg oral delayed release tablet: 1 tab(s) orally once a day (01 Sep 2022 13:11)  donepezil 10 mg oral tablet: 1 tab(s) orally once a day (at bedtime) (01 Sep 2022 13:11)  Melatonin 3 mg oral tablet: 2 tab(s) orally once a day (at bedtime) (01 Sep 2022 13:11)  quinapril 40 mg oral tablet: 1 tab(s) orally once a day (01 Sep 2022 13:11)  Vitamin D3 1250 mcg (50,000 intl units) oral capsule: 1 cap(s) orally once a month on the 3rd Monday  (01 Sep 2022 13:11)      Current Inpatient Medications :    ANTIBIOTICS:   piperacillin/tazobactam IVPB.. 3.375 Gram(s) IV Intermittent every 12 hours    MEDICATIONS  (STANDING):  donepezil 10 milliGRAM(s) Oral at bedtime  lactated ringers. 1000 milliLiter(s) (75 mL/Hr) IV Continuous <Continuous>  lactulose Syrup 30 Gram(s) Oral daily  pantoprazole    Tablet 40 milliGRAM(s) Oral before breakfast  polyethylene glycol 3350 17 Gram(s) Oral daily  senna 2 Tablet(s) Oral at bedtime    MEDICATIONS  (PRN):  bisacodyl Suppository 10 milliGRAM(s) Rectal daily PRN Constipation    ROS:  Unable to obtain due to : dementia    I&O's Detail    29 May 2024 07:01  -  30 May 2024 07:00  --------------------------------------------------------  IN:    IV PiggyBack: 100 mL    Lactated Ringers: 375 mL  Total IN: 475 mL    OUT:  Total OUT: 0 mL    Total NET: 475 mL      30 May 2024 07:01  -  30 May 2024 19:17  --------------------------------------------------------  IN:    Lactated Ringers: 525 mL  Total IN: 525 mL    OUT:  Total OUT: 0 mL    Total NET: 525 mL          Physical Exam:  Vital Signs Last 24 Hrs  T(C): 37.1 (30 May 2024 14:10), Max: 37.2 (29 May 2024 20:59)  T(F): 98.7 (30 May 2024 14:10), Max: 99 (29 May 2024 20:59)  HR: 95 (30 May 2024 14:10) (85 - 114)  BP: 147/76 (30 May 2024 14:10) (138/81 - 151/83)  RR: 18 (30 May 2024 14:10) (17 - 18)  SpO2: 90% (30 May 2024 14:10) (90% - 95%)    Parameters below as of 30 May 2024 14:10  Patient On (Oxygen Delivery Method): room air      Height (cm): 165.1 (05-30 @ 08:57)  Weight (kg): 54.9 (05-30 @ 08:57)  BMI (kg/m2): 20.1 (05-30 @ 08:57)  BSA (m2): 1.6 (05-30 @ 08:57)    General: no acute distress  Neck: supple, trachea midline  Lungs: decreased, no wheeze/rhonchi  Cardiovascular: regular rate and rhythm, S1 S2  Abdomen: soft, nontender, ND, bowel sounds normal  Neurological:  dementia  Skin: no rash  Extremities: no edema    Labs:                         10.2   24.03 )-----------( 356      ( 30 May 2024 09:50 )             32.6   05-30    140  |  105  |  31<H>  ----------------------------<  150<H>  4.5   |  25  |  1.48<H>    Ca    8.6      30 May 2024 09:50    TPro  7.0  /  Alb  2.3<L>  /  TBili  0.9  /  DBili  x   /  AST  63<H>  /  ALT  28  /  AlkPhos  81  05-29    Urine Microscopic-Add On (NC) (05.29.24 @ 08:50)    Red Blood Cell - Urine: Too Numerous to count /HPF   White Blood Cell - Urine: 10 /HPF   Bacteria: Many /HPF   Squamous Epithelial Cells: Present  Urinalysis (05.29.24 @ 08:50)    Glucose Qualitative, Urine: Negative mg/dL   Blood, Urine: Trace   pH Urine: 6.0   Color: Red   Urine Appearance: Turbid   Bilirubin: Small   Ketone - Urine: see note: Unable to obtain a result. Notified RN Fabian mg/dL   Specific Gravity: 1.031   Protein, Urine: 30 mg/dL   Urobilinogen: 0.2 mg/dL   Nitrite: Positive   Leukocyte Esterase Concentration: Large    RECENT CULTURES:      RADIOLOGY & ADDITIONAL STUDIES:    ACC: [id number] EXAM:  CT ABDOMEN AND PELVIS   ORDERED BY: SARY BRUCE     ACC: [id number] EXAM:  CT CHEST   ORDERED BY: SARY BRUCE     PROCEDURE DATE:  05/29/2024          INTERPRETATION:  CLINICAL INFORMATION: fever SCT    COMPARISON: None.    CONTRAST/COMPLICATIONS:  IV Contrast: NONE  Oral Contrast: NONE  Complications: None reported at time of study completion    PROCEDURE:  CT of the Chest, Abdomen and Pelvis was performed.  Sagittal and coronal reformats were performed.    FINDINGS:  CHEST:  LUNGS AND LARGE AIRWAYS: Patent central airways. Scattered airspace   opacities, most prominent in the right lower lobe.  PLEURA: No pleural effusion.  VESSELS: Within normal limits.  HEART: Heart size is normal.  No pericardial effusion.  MEDIASTINUM AND CELESTINO: No lymphadenopathy.  CHEST WALL AND LOWER NECK: Within normal limits.    ABDOMEN AND PELVIS:  LIVER: Within normal limits.  BILE DUCTS: Normal caliber.  GALLBLADDER: Within normal limits.  SPLEEN: Within normal limits.  PANCREAS: Atrophic with a few small cysts.  ADRENALS: Within normal limits.  KIDNEYS/URETERS: A small left renal cyst.    BLADDER: Small layering calculi.  REPRODUCTIVE ORGANS: Debris is noted in the lower vaginal canal.    BOWEL: No bowel obstruction. Questionable mass in the gastric fundus.   Rectum is distended with stool.  PERITONEUM: No ascites.  VESSELS:  Within normal limits.  RETROPERITONEUM/LYMPH NODES: No lymphadenopathy.  ABDOMINAL WALL: Within normal limits.  BONES: Acute appearing fracture of the right inferior pubic ramus.   Nondisplaced fracture involving the right anterior acetabulum.    IMPRESSION: A questionable gastric mass; recommend dual phase CT and GI   consultation.    Rectum distended by stool.    Airspace opacities inthe lungs possibly infection.    Acute appearing fracture of the right inferior pubic ramus.Nondisplaced   fracture involving the right anterior acetabulum.Assessment :       Assessment:  89YO F resident of [facility] PM Dementia HTN, HLD admitted with GIB and UTI  In ED Tmax 100.2 WBC 17K with bandemia. Noted mohamud. Ua with pyuria,     Plan :   Cont Zosyn  Fu cultures  Trend temps and cbc, H/H  Asp precautions      Continue with present regiment .  Approptiate use of antibiotics and adverse effects reviewed.    > 45 minutes spent in direct patient care reviewing  the notes, lab data/ imaging , discussion with multidisciplinary team. All questions were addressed and answered to the best of my capacity .    Thank you for allowing me to participate in the care of your patient .      Marguerite Torres MD  Infectious Disease  [phone]

## 2024-05-30 NOTE — CONSULT NOTE ADULT - SUBJECTIVE AND OBJECTIVE BOX
HPI:  Patient seen and examined last night   89 year old female with a history of HTN, HL, dementia who presents with rectal bleeding. The patient is unable to provide additional history. She was noted to have rectal bleeding at NH.   (30 May 2024 08:57)    PERTINENT PM/SXH:   Dementia    HTN (hypertension)    HLD (hyperlipidemia)        FAMILY HISTORY:    Family Hx substance abuse [ ]yes [ ]no  ITEMS NOT CHECKED ARE NOT PRESENT    SOCIAL HISTORY:   Significant other/partner[ ]  Children[ ]  Quaker/Spirituality:  Substance hx:  [ ]   Tobacco hx:  [ ]   Alcohol hx: [ ]   Home Opioid hx:  [ ] I-Stop Reference No:  Living Situation: [ ]Home  [ ]Long term care  [ ]Rehab [ ]Other    ADVANCE DIRECTIVES:    DNR/MOLST  [ ]  Living Will  [ ]   DECISION MAKER(s):  [ ] Health Care Proxy(s)  [ ] Surrogate(s)  [ ] Guardian           Name(s): Phone Number(s):    BASELINE (I)ADL(s) (prior to admission):  Story: [ ]Total  [ ] Moderate [ ]Dependent    Allergies    No Known Allergies    Intolerances    MEDICATIONS  (STANDING):  donepezil 10 milliGRAM(s) Oral at bedtime  pantoprazole  Injectable 40 milliGRAM(s) IV Push two times a day  polyethylene glycol 3350 17 Gram(s) Oral daily  senna 2 Tablet(s) Oral at bedtime    MEDICATIONS  (PRN):  atropine 1% Ophthalmic Solution for SubLingual Use 2 Drop(s) SubLingual every 6 hours PRN secretions  HYDROmorphone   Solution 1 milliGRAM(s) Oral every 4 hours PRN dyspnea  HYDROmorphone   Solution 1 milliGRAM(s) Oral every 4 hours PRN Severe Pain (7 - 10)  LORazepam    Concentrate 0.5 milliGRAM(s) Oral every 4 hours PRN anxiety, agitation, refractory dyspnea    PRESENT SYMPTOMS: [ ]Unable to self-report  [ ] CPOT [ ] PAINADs [ ] RDOS  Source if other than patient:  [ ]Family   [ ]Team     Pain: [ ]yes [ ]no  QOL impact -   Location -                    Aggravating factors -  Quality -  Radiation -  Timing-  Severity (0-10 scale):  Minimal acceptable level (0-10 scale):     CPOT:    https://www.[facility].org/getattachment/[email or web address]/Critical-Care-Pain-Observation-Tool-(CPOT)    PAIN AD Score:   http://geriatrictoolkit.[facility]/cog/painad.pdf (press ctrl +  left click to view)    Dyspnea:                           [ ]Mild [ ]Moderate [ ]Severe      RDOS:  0 to 2  minimal or no respiratory distress   3  mild distress  4 to 6 moderate distress  >7 severe distress  https://homecareinformation.net/handouts/hen/Respiratory_Distress_Observation_Scale.pdf (Ctrl +  left click to view)     Anxiety:                             [ ]Mild [ ]Moderate [ ]Severe  Fatigue:                             [ ]Mild [ ]Moderate [ ]Severe  Nausea:                             [ ]Mild [ ]Moderate [ ]Severe  Loss of appetite:              [ ]Mild [ ]Moderate [ ]Severe  Constipation:                    [ ]Mild [ ]Moderate [ ]Severe    PCSSQ[Palliative Care Spiritual Screening Question]   Severity (0-10):  Score of 4 or > indicate consideration of Chaplaincy referral.  Chaplaincy Referral: [ ] yes [ ] refused [ ] following [ ] Deferred     Caregiver [place]? : [ ] yes [ ] no [ ] Deferred [ ] Declined             Social work referral [ ] Patient & Family Centered Care Referral [ ]     Anticipatory Grief present?:  [ ] yes [ ] no  [ ] Deferred                  Social work referral [ ] Chaplaincy Referral[ ]      Other Symptoms:  [ ]All other review of systems negative     Palliative Performance Status Version 2:         %    http://npcrc.org/files/news/palliative_performance_scale_ppsv2.pdf  PHYSICAL EXAM:  Vital Signs Last 24 Hrs  T(C): 36.6 (30 May 2024 10:07), Max: 37.2 (29 May 2024 20:59)  T(F): 97.8 (30 May 2024 10:07), Max: 99 (29 May 2024 20:59)  HR: 114 (30 May 2024 10:07) (85 - 114)  BP: 143/75 (30 May 2024 10:07) (138/81 - 151/83)  BP(mean): --  RR: 17 (30 May 2024 10:07) (17 - 18)  SpO2: 91% (30 May 2024 10:07) (91% - 95%)    Parameters below as of 30 May 2024 10:07  Patient On (Oxygen Delivery Method): room air     I&O's Summary    29 May 2024 07:01  -  30 May 2024 07:00  --------------------------------------------------------  IN: 475 mL / OUT: 0 mL / NET: 475 mL      GENERAL: [ ]Cachexia    [ ]Alert  [ ]Oriented x   [ ]Lethargic  [ ]Unarousable  [ ]Verbal  [ ]Non-Verbal  Behavioral:   [ ] Anxiety  [ ] Delirium [ ] Agitation [ ] Other  HEENT:  [ ]Normal   [ ]Dry mouth   [ ]ET Tube/Trach  [ ]Oral lesions  PULMONARY:   [ ]Clear [ ]Tachypnea  [ ]Audible excessive secretions   [ ]Rhonchi        [ ]Right [ ]Left [ ]Bilateral  [ ]Crackles        [ ]Right [ ]Left [ ]Bilateral  [ ]Wheezing     [ ]Right [ ]Left [ ]Bilateral  [ ]Diminished breath sounds [ ]right [ ]left [ ]bilateral  CARDIOVASCULAR:    [ ]Regular [ ]Irregular [ ]Tachy  [ ]Yordan [ ]Murmur [ ]Other  GASTROINTESTINAL:  [ ]Soft  [ ]Distended   [ ]+BS  [ ]Non tender [ ]Tender  [ ]Other [ ]PEG [ ]OGT/ NGT  Last BM:  GENITOURINARY:  [ ]Normal [ ] Incontinent   [ ]Oliguria/Anuria   [ ]Bruce  MUSCULOSKELETAL:   [ ]Normal   [ ]Weakness  [ ]Bed/Wheelchair bound [ ]Edema  NEUROLOGIC:   [ ]No focal deficits  [ ]Cognitive impairment  [ ]Dysphagia [ ]Dysarthria [ ]Paresis [ ]Other   SKIN:   [ ]Normal  [ ]Rash  [ ]Other  [ ]Pressure ulcer(s)       Present on admission [ ]y [ ]n    CRITICAL CARE:  [ ] Shock Present  [ ]Septic [ ]Cardiogenic [ ]Neurologic [ ]Hypovolemic  [ ]  Vasopressors [ ]  Inotropes   [ ]Respiratory failure present [ ]Mechanical ventilation [ ]Non-invasive ventilatory support [ ]High flow    [ ]Acute  [ ]Chronic [ ]Hypoxic  [ ]Hypercarbic [ ]Other  [ ]Other organ failure     LABS:                        10.2   24.03 )-----------( 356      ( 30 May 2024 09:50 )             32.6   05-30    140  |  105  |  31<H>  ----------------------------<  150<H>  4.5   |  25  |  1.48<H>    Ca    8.6      30 May 2024 09:50    TPro  7.0  /  Alb  2.3<L>  /  TBili  0.9  /  DBili  x   /  AST  63<H>  /  ALT  28  /  AlkPhos  81  05-29  PT/INR - ( 29 May 2024 08:30 )   PT: 11.5 sec;   INR: 1.06 ratio         PTT - ( 29 May 2024 08:30 )  PTT:26.5 sec    Urinalysis Basic - ( 30 May 2024 09:50 )    Color: x / Appearance: x / SG: x / pH: x  Gluc: 150 mg/dL / Ketone: x  / Bili: x / Urobili: x   Blood: x / Protein: x / Nitrite: x   Leuk Esterase: x / RBC: x / WBC x   Sq Epi: x / Non Sq Epi: x / Bacteria: x      RADIOLOGY & ADDITIONAL STUDIES:    PROTEIN CALORIE MALNUTRITION PRESENT: [ ]mild [ ]moderate [ ]severe [ ]underweight [ ]morbid obesity  https://www.andeal.org/vault/2440/web/files/ONC/Table_Clinical%20Characteristics%20to%20Document%20Malnutrition-White%20JV%20et%20al%202012.pdf    Height (cm): 165.1 (05-30-24 @ 08:57), 165.1 (02-11-24 @ 12:14)  Weight (kg): 54.9 (05-30-24 @ 08:57), 55.293 (02-11-24 @ 12:14)  BMI (kg/m2): 20.1 (05-30-24 @ 08:57), 20.3 (02-11-24 @ 12:14)    [ ]PPSV2 < or = to 30% [ ]significant weight loss  [ ]poor nutritional intake  [ ]anasarca[ ]Artificial Nutrition      Other REFERRALS:  [ ]Hospice  [ ]Child Life  [ ]Social Work  [ ]Case management [ ]Holistic Therapy      HPI:  Patient seen and examined last night   89 year old female with a history of HTN, HL, dementia who presents with rectal bleeding. The patient is unable to provide additional history. She was noted to have rectal bleeding at NH.   (30 May 2024 08:57)    PERTINENT PM/SXH:   Dementia    HTN (hypertension)    HLD (hyperlipidemia)        FAMILY HISTORY: no known hx in first degree relatives    Family Hx substance abuse [ ]yes [x ]no  ITEMS NOT CHECKED ARE NOT PRESENT    SOCIAL HISTORY:   Significant other/partner[ ]  Children[ ]  Muslim/Spirituality:  Substance hx:  [ ]   Tobacco hx:  [ ]   Alcohol hx: [ ]   Home Opioid hx:  [ ] I-Stop Reference No:  Living Situation: [ ]Home  [ x]Long term care  [ ]Rehab [ ]Other    ADVANCE DIRECTIVES:    DNR/MOLST  [x ]  Living Will  [ ]   DECISION MAKER(s):  [ x] Health Care Proxy(s)  [ ] Surrogate(s)  [ ] Guardian           Name(s): Phone Number(s): linette Ruth, number per EMR    BASELINE (I)ADL(s) (prior to admission):  [place]: [ ]Total  [x ] Moderate [ ]Dependent    Allergies    No Known Allergies    Intolerances    MEDICATIONS  (STANDING):  donepezil 10 milliGRAM(s) Oral at bedtime  pantoprazole  Injectable 40 milliGRAM(s) IV Push two times a day  polyethylene glycol 3350 17 Gram(s) Oral daily  senna 2 Tablet(s) Oral at bedtime    MEDICATIONS  (PRN):  atropine 1% Ophthalmic Solution for SubLingual Use 2 Drop(s) SubLingual every 6 hours PRN secretions  HYDROmorphone   Solution 1 milliGRAM(s) Oral every 4 hours PRN dyspnea  HYDROmorphone   Solution 1 milliGRAM(s) Oral every 4 hours PRN Severe Pain (7 - 10)  LORazepam    Concentrate 0.5 milliGRAM(s) Oral every 4 hours PRN anxiety, agitation, refractory dyspnea    PRESENT SYMPTOMS: [ x]Unable to self-report  [ ] CPOT [ x] PAINADs [x ] RDOS  Source if other than patient:  [ ]Family   [ ]Team     Pain: [ ]yes [ ]no  QOL impact -   Location -                    Aggravating factors -  Quality -  Radiation -  Timing-  Severity (0-10 scale):  Minimal acceptable level (0-10 scale):     CPOT:    https://www.[facility].org/getattachment/xdr71l71-9r0i-[email or web address]-2483c2160w6p/Critical-Care-Pain-Observation-Tool-(CPOT)    PAIN AD Score:   http://geriatrictoolkit.[facility]/cog/painad.pdf (press ctrl +  left click to view)    Dyspnea:                           [ ]Mild [ ]Moderate [ ]Severe      RDOS:  0 to 2  minimal or no respiratory distress   3  mild distress  4 to 6 moderate distress  >7 severe distress  https://homecareinformation.net/handouts/hen/Respiratory_Distress_Observation_Scale.pdf (Ctrl +  left click to view)     Anxiety:                             [ ]Mild [ ]Moderate [ ]Severe  Fatigue:                             [ ]Mild [ ]Moderate [ ]Severe  Nausea:                             [ ]Mild [ ]Moderate [ ]Severe  Loss of appetite:              [ ]Mild [ ]Moderate [ ]Severe  Constipation:                    [ ]Mild [ ]Moderate [ ]Severe    PCSSQ[Palliative Care Spiritual Screening Question]   Severity (0-10):  Score of 4 or > indicate consideration of Chaplaincy referral.  Chaplaincy Referral: [ ] yes [ ] refused [ ] following [x ] Deferred     Caregiver [place]? : [ ] yes [ x] no [ ] Deferred [ ] Declined             Social work referral [ ] Patient & Family Centered Care Referral [ ]     Anticipatory Grief present?:  [ ] yes [ x] no  [ ] Deferred                  Social work referral [ ] Chaplaincy Referral[ ]      Other Symptoms:  [ ]All other review of systems negative     Palliative Performance Status Version 2:    30     %    http://[facility].org/files/news/palliative_performance_scale_ppsv2.pdf  PHYSICAL EXAM:  Vital Signs Last 24 Hrs  T(C): 36.6 (30 May 2024 10:07), Max: 37.2 (29 May 2024 20:59)  T(F): 97.8 (30 May 2024 10:07), Max: 99 (29 May 2024 20:59)  HR: 114 (30 May 2024 10:07) (85 - 114)  BP: 143/75 (30 May 2024 10:07) (138/81 - 151/83)  BP(mean): --  RR: 17 (30 May 2024 10:07) (17 - 18)  SpO2: 91% (30 May 2024 10:07) (91% - 95%)    Parameters below as of 30 May 2024 10:07  Patient On (Oxygen Delivery Method): room air     I&O's Summary    29 May 2024 07:01  -  30 May 2024 07:00  --------------------------------------------------------  IN: 475 mL / OUT: 0 mL / NET: 475 mL      GENERAL: [x ]Cachexia    [ x]Alert  [ ]Oriented x   [ ]Lethargic  [ ]Unarousable  [ ]Verbal  [ ]Non-Verbal  Behavioral:   [ ] Anxiety  [x ] Delirium [x ] Agitation [ ] Other  HEENT:  [ ]Normal   [ x]Dry mouth   [ ]ET Tube/Trach  [ ]Oral lesions  PULMONARY:   [x ]Clear [ ]Tachypnea  [ ]Audible excessive secretions   [ ]Rhonchi        [ ]Right [ ]Left [ ]Bilateral  [ ]Crackles        [ ]Right [ ]Left [ ]Bilateral  [ ]Wheezing     [ ]Right [ ]Left [ ]Bilateral  [ ]Diminished breath sounds [ ]right [ ]left [ ]bilateral  CARDIOVASCULAR:    [ x]Regular [ ]Irregular [ ]Tachy  [ ]Yordan [ ]Murmur [ ]Other  GASTROINTESTINAL:  [x ]Soft  [ ]Distended   [x ]+BS  [ ]Non tender [ ]Tender  [ ]Other [ ]PEG [ ]OGT/ NGT  Last BM:  GENITOURINARY:  [ ]Normal [ x] Incontinent   [ ]Oliguria/Anuria   [ ]Bruce  MUSCULOSKELETAL:   [ ]Normal   [x ]Weakness  [x ]Bed/Wheelchair bound [ ]Edema  NEUROLOGIC:   [ ]No focal deficits  [x ]Cognitive impairment  [ ]Dysphagia [ ]Dysarthria [ ]Paresis [ ]Other   SKIN:   [ ]Normal  [ ]Rash  [ ]Other  [ ]Pressure ulcer(s)       Present on admission [ ]y [ ]n    CRITICAL CARE:  [ ] Shock Present  [ ]Septic [ ]Cardiogenic [ ]Neurologic [ ]Hypovolemic  [ ]  Vasopressors [ ]  Inotropes   [ ]Respiratory failure present [ ]Mechanical ventilation [ ]Non-invasive ventilatory support [ ]High flow    [ ]Acute  [ ]Chronic [ ]Hypoxic  [ ]Hypercarbic [ ]Other  [ ]Other organ failure     LABS:                        10.2   24.03 )-----------( 356      ( 30 May 2024 09:50 )             32.6   05-30    140  |  105  |  31<H>  ----------------------------<  150<H>  4.5   |  25  |  1.48<H>    Ca    8.6      30 May 2024 09:50    TPro  7.0  /  Alb  2.3<L>  /  TBili  0.9  /  DBili  x   /  AST  63<H>  /  ALT  28  /  AlkPhos  81  05-29  PT/INR - ( 29 May 2024 08:30 )   PT: 11.5 sec;   INR: 1.06 ratio         PTT - ( 29 May 2024 08:30 )  PTT:26.5 sec    Urinalysis Basic - ( 30 May 2024 09:50 )    Color: x / Appearance: x / SG: x / pH: x  Gluc: 150 mg/dL / Ketone: x  / Bili: x / Urobili: x   Blood: x / Protein: x / Nitrite: x   Leuk Esterase: x / RBC: x / WBC x   Sq Epi: x / Non Sq Epi: x / Bacteria: x      RADIOLOGY & ADDITIONAL STUDIES:  < from: CT Abdomen and Pelvis No Cont (05.29.24 @ 08:32) >    ACC: [id number] EXAM:  CT ABDOMEN AND PELVIS   ORDERED BY: SARY BRUCE     ACC: [id number] EXAM:  CT CHEST   ORDERED BY: SARY BRUCE     PROCEDURE DATE:  05/29/2024          INTERPRETATION:  CLINICAL INFORMATION: fever SCT    COMPARISON: None.    CONTRAST/COMPLICATIONS:  IV Contrast: NONE  Oral Contrast: NONE  Complications: None reported at time of study completion    PROCEDURE:  CT of the Chest, Abdomen and Pelvis was performed.  Sagittal and coronal reformats were performed.    FINDINGS:  CHEST:  LUNGS AND LARGE AIRWAYS: Patent central airways. Scattered airspace   opacities, most prominent in the right lower lobe.  PLEURA: No pleural effusion.  VESSELS: Within normal limits.  HEART: Heart size is normal.  No pericardial effusion.  MEDIASTINUM AND CELESTINO: No lymphadenopathy.  CHEST WALL AND LOWER NECK: Within normal limits.    ABDOMEN AND PELVIS:  LIVER: Within normal limits.  BILE DUCTS: Normal caliber.  GALLBLADDER: Within normal limits.  SPLEEN: Within normal limits.  PANCREAS: Atrophic with a few small cysts.  ADRENALS: Within normal limits.  KIDNEYS/URETERS: A small left renal cyst.    BLADDER: Small layering calculi.  REPRODUCTIVE ORGANS: Debris is noted in the lower vaginal canal.    BOWEL: No bowel obstruction. Questionable mass in the gastric fundus.   Rectum is distended with stool.  PERITONEUM: No ascites.  VESSELS:  Within normal limits.  RETROPERITONEUM/LYMPH NODES: No lymphadenopathy.  ABDOMINAL WALL: Within normal limits.  BONES: Acute appearing fracture of the right inferior pubic ramus.   Nondisplaced fracture involving the right anterior acetabulum.    IMPRESSION: A questionable gastric mass; recommend dual phase CT and GI   consultation.    Rectum distended by stool.    Airspace opacities inthe lungs possibly infection.    Acute appearing fracture of the right inferior pubic ramus.Nondisplaced   fracture involving the right anterior acetabulum.    --- End of Report ---            SHELBI DIEGO MD; Attending Radiologist  This document has been electronically signed. May 29 2024 10:30AM    < end of copied text >      PROTEIN CALORIE MALNUTRITION PRESENT: [ ]mild [ ]moderate [ ]severe [ ]underweight [ ]morbid obesity  https://www.andeal.org/vault/2440/web/files/ONC/Table_Clinical%20Characteristics%20to%20Document%20Malnutrition-White%20JV%20et%20al%202012.pdf    Height (cm): 165.1 (05-30-24 @ 08:57), 165.1 (02-11-24 @ 12:14)  Weight (kg): 54.9 (05-30-24 @ 08:57), 55.293 (02-11-24 @ 12:14)  BMI (kg/m2): 20.1 (05-30-24 @ 08:57), 20.3 (02-11-24 @ 12:14)    [ x]PPSV2 < or = to 30% [ ]significant weight loss  [x ]poor nutritional intake  [ ]anasarca[ ]Artificial Nutrition      Other REFERRALS:  [ ]Hospice  [ ]Child Life  [ x]Social Work  [ ]Case management [ ]Holistic Therapy

## 2024-05-30 NOTE — CONSULT NOTE ADULT - ASSESSMENT
89 year old female with a history of HTN, HL, dementia who presents with rectal bleeding, likely gastric mass. palliative consulted for GOC

## 2024-05-30 NOTE — CARE COORDINATION ASSESSMENT. - DOES THE PATIENT HAVE ADVANCE DIRECTIVES?
Pink MOLST dated 06/05/2023 indicating the following was sent to hospital w/ patient: "Do not attempt resuscitation (DNR); do not intubate (DNI) and do not use non-invasive ventilation or mechanical ventilation; send to hospital only if pain & severe symptoms cannot be controlled; limited medical interventions only as described below; no feeding tube; determine use or limitation of IV fluids as need arises; use antibiotics to treat infections; determine use or limitation if renal failure occurs."  spoke w/ patient's son, Mr. Guy, @ cell [phone] who confirmed that these remain his wishes for patient's Advance Directives. New original pink MOLST was completed & placed in hospital chart as the initial MOLST was missing a physician signature in sections E & H./Yes

## 2024-05-30 NOTE — GOALS OF CARE CONVERSATION - ADVANCED CARE PLANNING - TREATMENT GUIDELINE COMMENT
Do not attempt resuscitation (DNR); do not intubate (DNI) and do not use non-invasive ventilation or mechanical ventilation; send to hospital only if pain & severe symptoms cannot be controlled; limited medical interventions only as described below; no feeding tube; determine use or limitation of IV fluids as need arises; use antibiotics to treat infections; determine use or limitation if renal failure occurs.
Requested that EKG leads stay on during hospital stay

## 2024-05-30 NOTE — H&P ADULT - HEIGHT IN INCHES
Patient tolerated infusion well and without incident, cadd pump connected blinking green, spill bin give also to patient with instructions, patient educated on pump and when to return  Patient d/c in stable condition 5 Include Tumor Staging In Mohs Note?: Please Select the Appropriate Response

## 2024-05-30 NOTE — GOALS OF CARE CONVERSATION - ADVANCED CARE PLANNING - CONVERSATION DETAILS
Pink MOLST dated 06/05/2023 indicating the following was sent to hospital w/ patient: "Do not attempt resuscitation (DNR); do not intubate (DNI) and do not use non-invasive ventilation or mechanical ventilation; send to hospital only if pain & severe symptoms cannot be controlled; limited medical interventions only as described below; no feeding tube; determine use or limitation of IV fluids as need arises; use antibiotics to treat infections; determine use or limitation if renal failure occurs."  spoke w/ patient's son, Mr. Guy, @ cell [phone] who confirmed that these remain his wishes for patient's Advance Directives. New original pink MOLST was completed & placed in hospital chart as the initial MOLST was missing a physician signature in sections E & H.

## 2024-05-30 NOTE — CONSULT NOTE ADULT - PROBLEM SELECTOR RECOMMENDATION 4
discussed with LB Pope and family.   Message sent for Dr. Megha Collazo MD, [facility]; Palliative Care Attending, Ethicist. [phone]

## 2024-05-30 NOTE — H&P ADULT - HISTORY OF PRESENT ILLNESS
Patient seen and examined last night   89 year old female with a history of HTN, HL, dementia who presents with rectal bleeding. The patient is unable to provide additional history. She was noted to have rectal bleeding at NH.

## 2024-05-30 NOTE — CONSULT NOTE ADULT - SUBJECTIVE AND OBJECTIVE BOX
History of Present Illness: The patient is an 89 year old female with a history of HTN, HL, dementia who presents with rectal bleeding. The patient is unable to provide additional history. She was noted to have rectal bleeding at NH.    Past Medical/Surgical History:  HTN, HL, dementia    Medications:  Home Medications:  acetaminophen 325 mg oral tablet: 2 tab(s) orally every 6 hours, As Needed (01 Sep 2022 13:11)  Aspirin Enteric Coated 81 mg oral delayed release tablet: 1 tab(s) orally once a day (01 Sep 2022 13:11)  donepezil 10 mg oral tablet: 1 tab(s) orally once a day (at bedtime) (01 Sep 2022 13:11)  Melatonin 3 mg oral tablet: 2 tab(s) orally once a day (at bedtime) (01 Sep 2022 13:11)  quinapril 40 mg oral tablet: 1 tab(s) orally once a day (01 Sep 2022 13:11)  Vitamin D3 1250 mcg (50,000 intl units) oral capsule: 1 cap(s) orally once a month on the 3rd Monday  (01 Sep 2022 13:11)      Family History: Non-contributory family history of premature cardiovascular atherosclerotic disease    Social History: No tobacco, alcohol or drug use    Review of Systems:  General: No fevers, chills, weight gain  Skin: No rashes, color changes  Cardiovascular: No chest pain, orthopnea  Respiratory: No shortness of breath, cough  Gastrointestinal: No nausea, abdominal pain  Genitourinary: No incontinence, pain with urination  Musculoskeletal: No pain, swelling, decreased range of motion  Neurological: No headache, weakness  Psychiatric: No depression, anxiety  Endocrine: No weight gain, increased thirst  All other systems are comprehensively negative.    Physical Exam:  Vitals:        Vital Signs Last 24 Hrs  T(C): 36.8 (30 May 2024 05:22), Max: 37.2 (29 May 2024 20:59)  T(F): 98.2 (30 May 2024 05:22), Max: 99 (29 May 2024 20:59)  HR: 106 (30 May 2024 05:22) (85 - 106)  BP: 151/83 (30 May 2024 05:22) (138/81 - 151/83)  BP(mean): --  RR: 17 (30 May 2024 05:22) (17 - 18)  SpO2: 91% (30 May 2024 05:22) (91% - 95%)  Parameters below as of 30 May 2024 05:22  Patient On (Oxygen Delivery Method): room air  General: NAD  HEENT: MMM  Neck: No JVD, no carotid bruit  Lungs: CTAB  CV: RRR, nl S1/S2, no M/R/G  Abdomen: S/NT/ND, +BS  Extremities: No LE edema, no cyanosis  Neuro: AAOx1  Skin: No rash    Labs:                        12.6   17.19 )-----------( 355      ( 29 May 2024 08:30 )             40.4     05-29    135  |  98  |  44<H>  ----------------------------<  158<H>  6.0<H>   |  26  |  1.72<H>    Ca    9.7      29 May 2024 08:30    TPro  7.0  /  Alb  2.3<L>  /  TBili  0.9  /  DBili  x   /  AST  63<H>  /  ALT  28  /  AlkPhos  81  05-29        PT/INR - ( 29 May 2024 08:30 )   PT: 11.5 sec;   INR: 1.06 ratio         PTT - ( 29 May 2024 08:30 )  PTT:26.5 sec    ECG/Telemetry: Sinus tachycardia, LAD, no ST abnormality

## 2024-05-30 NOTE — CARE COORDINATION ASSESSMENT. - NSCAREPROVIDERS_GEN_ALL_CORE_FT
CARE PROVIDERS:  Accepting Physician: Melissa Cleveland  Administration: Rajwinder Mayes  Administration: Austin Nguyễn  Administration: Chantal Simental  Administration: Mahendra Singleton  Admitting: Melissa Cleveland  Attending: Melissa Cleveland  Case Management: Catie Dunaway  Consultant: Jeremy Feldman  Consultant: Marguerite Torres  Consultant: Austin Nguyễn  Consultant: Lottie Kim ED Attending: Lanre Blackmon  Infection Control: Mara Morales  Nurse: Barb Moctezuma  Ordered: ServiceAccount, SCMMLM  Override: Ben Sosa  Override: Rachel Ball  PCA/Nursing Assistant: Jaz Lopez  Primary Team: Tristen Moraes  Registered Dietitian: Luna Ruvalcaba  Respiratory Therapy: Joel Kasper  : Libby Muniz  Team: SY Palliative Care, Team  UR// Supp. Assoc.: Jackie Burton

## 2024-05-30 NOTE — GOALS OF CARE CONVERSATION - ADVANCED CARE PLANNING - TREATMENT GUIDELINES
DNR/Comfort measures only/No artificial nutrition/No antibiotics/Artificial nutrition trial/Antibiotic trial/IV fluid trial/No IV fluids/DNI
DNR/No artificial nutrition/DNI
DNR/Comfort measures only/Do not re-hospitalize/No blood draws/No artificial nutrition/No antibiotics/No IV fluids/DNI

## 2024-05-30 NOTE — CASE MANAGEMENT PROGRESS NOTE - NSCMPROGRESSNOTE_GEN_ALL_CORE
CM noted Case Management referral for [facility].  CM did a chart review and spoke with treatment team: Pt. seen by Palliative team, Pt. placed on confront care today and family completed a MOLST form.  Pt. is admitted from [facility] and will be followed by Social Work team during stay.  CM team remains available if needed.

## 2024-05-30 NOTE — CARE COORDINATION ASSESSMENT. - NSADDITIONAL INFORMATION_FT
Mrs. Eugenie Casarez is an 88y/o , domiciled white female, w/ PMHx as indicated below, who presents to [facility] secondary to GI bleed. Given patient's baseline impaired cognition,  secured collateral information from patient's son Al via phone call for completion of care coordination assessment. Per son, patient has resided @ [facility] in [place] since August 2021, and she is typically able to attend to her activities of daily living (ADLs) w/o the use of any durable medical equipment as she is ambulatory on her own w/o any devices or assist. Mrs. Casarez actually typically wears a wander guard bracelet as she has tried to leave the facility w/ patient's other son during visiting hours. Son identifies that patient had 3 falls about 2wks ago but was not sent to hospital and seemed not to be in any pain, though he acknowledged that her dementia likely affects her ability to express those kinds of needs. Patient has been very very confused since 2021 and often does not recognize her family members at all. He reports to be agreeable to patient returning to [facility] on discharge if that remains the most appropriate level of care for her.    Pink MOLST dated 06/05/2023 indicating the following was sent to hospital w/ patient: "Do not attempt resuscitation (DNR); do not intubate (DNI) and do not use non-invasive ventilation or mechanical ventilation; send to hospital only if pain & severe symptoms cannot be controlled; limited medical interventions only as described below; no feeding tube; determine use or limitation of IV fluids as need arises; use antibiotics to treat infections; determine use or limitation if renal failure occurs."  spoke w/ patient's son, Mr. Guy, @ cell [phone] who confirmed that these remain his wishes for patient's Advance Directives. New original pink MOLST was completed & placed in hospital chart as the initial MOLST was missing a physician signature in sections E & H.

## 2024-05-30 NOTE — CONSULT NOTE ADULT - ASSESSMENT
89 year old female with a history of HTN, HLD, dementia who presents with rectal bleeding. The patient is unable to provide additional history. She was noted to have rectal bleeding at NH.    dementia  OP  OA  HTN  HLD  Anemia    ct imaging reviewed - ? gastric mass and ? airspace opacities   89 year old female with a history of HTN, HLD, dementia who presents with rectal bleeding. The patient is unable to provide additional history. She was noted to have rectal bleeding at NH.    dementia  OP  OA  HTN  HLD  Anemia  pelvic fx  ataxic gait    ct imaging reviewed - ? gastric mass and ? airspace opacities  pall eval noted  GOC - DNR DNI  Pall measures in effect  oral hygiene  skin care  pain rx regimen  assist with needs  fall prec

## 2024-05-30 NOTE — GOALS OF CARE CONVERSATION - ADVANCED CARE PLANNING - CONVERSATION DETAILS
New MOLST completed w/ patient's son Al @ bedside indicating DNR, DNI, do not rehospitalize, comfort measures only, no feeding tube, no IV fluids, no antibiotics, & no dialysis.

## 2024-05-30 NOTE — GOALS OF CARE CONVERSATION - ADVANCED CARE PLANNING - CONVERSATION DETAILS
Spoke with Spoke with son Al who is the primary healthcare agent for his mother states patients mental status has limited her function in day to day life states she no longer remembers him or his brother. Al states that he wants her to be comfortable and to understand the what the next steps would be in her care. I explained that she has a mass in her abdomen which is most likely contributing to her bleeding and that is not operable based on the patients clinical status according to GI notes in chart. Explained the poor prognosis and that continuing therapy with transfusion would not treat the underlying process of her bleeding. Son displayed understanding of her prognosis and clinical course and would like to proceed with comfort care at this time.

## 2024-05-30 NOTE — CONSULT NOTE ADULT - PROBLEM SELECTOR RECOMMENDATION 3
DNR/DNI  CMO -doesn't meet inpatient hospice criteria  linette Ruth amenable to d/c back to [facility] with comfort care programSANTANA on chart for CMO    for symptoms:  po hydromorphone solution 1mg q4h prn pain or dyspnea  po ativan solution 0.5m-1mg q4h prn for anxiety, agitation, refractory dyspnea  atropine 1% opthalmic solution 2 drops SL q4-6h prn secretions  senna HS bowel regimen    if loses oral route or uncontrolled symptoms, rec the following:  dilaudid 0.2-0.5mg IV q2h prn for pain, dyspnea  ativan 0.5mg IV q2h prn for anxiety, agitation, refractory dyspnea  glycopyrrolate 0.2mg q6h prn for secretion  dulc supp FL PRN constipation

## 2024-05-30 NOTE — GOALS OF CARE CONVERSATION - ADVANCED CARE PLANNING - CONVERSATION/DISCUSSION
Diagnosis/Prognosis/MOLST Discussed/Treatment Options/Hospice Referral/Palliative Care Referral
MOLST Discussed
Prognosis/MOLST Discussed/Treatment Options/Hospice Referral/Palliative Care Referral

## 2024-05-30 NOTE — CONSULT NOTE ADULT - ASSESSMENT
The patient is an 89 year old female with a history of HTN, HL, dementia who presents with rectal bleeding.    Plan:  - ECG with sinus tachycardia  - CT chest with possible PNA  - CT A/P with possible gastric mass  - Hold quinapril due to GEORGE  - Discontinue aspirin  - GI follow-up  - Continue IV fluids  - GOC discussions

## 2024-05-30 NOTE — CONSULT NOTE ADULT - PROBLEM SELECTOR RECOMMENDATION 2
poor candidate for intervention- surgery or DMT  supportive care  PO hydromorphone solution PRN for pain

## 2024-05-30 NOTE — GOALS OF CARE CONVERSATION - ADVANCED CARE PLANNING - FUTURE HOSPITALIZATION/TRANSFER
Do not send to hospital unless pain or severe symptoms cannot be otherwise controlled

## 2024-05-30 NOTE — CONSULT NOTE ADULT - SUBJECTIVE AND OBJECTIVE BOX
Date/Time Patient Seen:  		  Referring MD:   Data Reviewed	       Patient is a 89y old  Female who presents with a chief complaint of Rectal Bleeding (30 May 2024 09:58)      Subjective/HPI   89 year old female with a history of HTN, HL, dementia who presents with rectal bleeding. The patient is unable to provide additional history. She was noted to have rectal bleeding at NH.  PAST MEDICAL & SURGICAL HISTORY:  Dementia    HTN (hypertension)    HLD (hyperlipidemia)          Medication list         MEDICATIONS  (STANDING):  donepezil 10 milliGRAM(s) Oral at bedtime  lactated ringers. 1000 milliLiter(s) (75 mL/Hr) IV Continuous <Continuous>  lactulose Syrup 30 Gram(s) Oral daily  pantoprazole  Injectable 40 milliGRAM(s) IV Push two times a day  piperacillin/tazobactam IVPB.. 3.375 Gram(s) IV Intermittent every 12 hours  polyethylene glycol 3350 17 Gram(s) Oral daily  senna 2 Tablet(s) Oral at bedtime    MEDICATIONS  (PRN):  bisacodyl Suppository 10 milliGRAM(s) Rectal daily PRN Constipation  PAST MEDICAL/SURGICAL/FAMILY/SOCIAL HISTORY:    Past Medical, Past Surgical, and Family History:  PAST MEDICAL HISTORY:  Dementia     HLD (hyperlipidemia)     HTN (hypertension).     Tobacco Usage:  · Tobacco Usage	Unknown if ever smoked    ALLERGIES AND HOME MEDICATIONS:   Allergies:        Allergies:  	No Known Allergies:     Home Medications:   * Incomplete Medication History as of 01-Sep-2022 13:11 documented in Structured Notes  · 	acetaminophen 325 mg oral tablet: 2 tab(s) orally every 6 hours, As Needed  · 	Aspirin Enteric Coated 81 mg oral delayed release tablet: 1 tab(s) orally once a day  · 	quinapril 40 mg oral tablet: 1 tab(s) orally once a day  · 	Vitamin D3 1250 mcg (50,000 intl units) oral capsule: 1 cap(s) orally once a month on the 3rd Monday   · 	Melatonin 3 mg oral tablet: 2 tab(s) orally once a day (at bedtime)  · 	donepezil 10 mg oral tablet: 1 tab(s) orally once a day (at bedtime)    REVIEW OF SYSTEMS:    Review of Systems:  · UNABLE TO OBTAIN: Dementia         Vitals log        ICU Vital Signs Last 24 Hrs  T(C): 36.6 (30 May 2024 10:07), Max: 37.2 (29 May 2024 20:59)  T(F): 97.8 (30 May 2024 10:07), Max: 99 (29 May 2024 20:59)  HR: 114 (30 May 2024 10:07) (85 - 114)  BP: 143/75 (30 May 2024 10:07) (138/81 - 151/83)  BP(mean): --  ABP: --  ABP(mean): --  RR: 17 (30 May 2024 10:07) (17 - 18)  SpO2: 91% (30 May 2024 10:07) (91% - 95%)    O2 Parameters below as of 30 May 2024 10:07  Patient On (Oxygen Delivery Method): room air                 Input and Output:  I&O's Detail    29 May 2024 07:01  -  30 May 2024 07:00  --------------------------------------------------------  IN:    IV PiggyBack: 100 mL    Lactated Ringers: 375 mL  Total IN: 475 mL    OUT:  Total OUT: 0 mL    Total NET: 475 mL          Lab Data                        10.2   24.03 )-----------( 356      ( 30 May 2024 09:50 )             32.6     05-30    140  |  105  |  31<H>  ----------------------------<  150<H>  4.5   |  25  |  1.48<H>    Ca    8.6      30 May 2024 09:50    TPro  7.0  /  Alb  2.3<L>  /  TBili  0.9  /  DBili  x   /  AST  63<H>  /  ALT  28  /  AlkPhos  81  05-29            Review of Systems	      Objective     Physical Examination        Pertinent Lab findings & Imaging      Janis:  NO   Adequate UO     I&O's Detail    29 May 2024 07:01  -  30 May 2024 07:00  --------------------------------------------------------  IN:    IV PiggyBack: 100 mL    Lactated Ringers: 375 mL  Total IN: 475 mL    OUT:  Total OUT: 0 mL    Total NET: 475 mL               Discussed with:     Cultures:	        Radiology    INTERPRETATION:  CLINICAL INFORMATION: fever SCT    COMPARISON: None.    CONTRAST/COMPLICATIONS:  IV Contrast: NONE  Oral Contrast: NONE  Complications: None reported at time of study completion    PROCEDURE:  CT of the Chest, Abdomen and Pelvis was performed.  Sagittal and coronal reformats were performed.    FINDINGS:  CHEST:  LUNGS AND LARGE AIRWAYS: Patent central airways. Scattered airspace   opacities, most prominent in the right lower lobe.  PLEURA: No pleural effusion.  VESSELS: Within normal limits.  HEART: Heart size is normal.  No pericardial effusion.  MEDIASTINUM AND CELESTINO: No lymphadenopathy.  CHEST WALL AND LOWER NECK: Within normal limits.    ABDOMEN AND PELVIS:  LIVER: Within normal limits.  BILE DUCTS: Normal caliber.  GALLBLADDER: Within normal limits.  SPLEEN: Within normal limits.  PANCREAS: Atrophic with a few small cysts.  ADRENALS: Within normal limits.  KIDNEYS/URETERS: A small left renal cyst.    BLADDER: Small layering calculi.  REPRODUCTIVE ORGANS: Debris is noted in the lower vaginal canal.    BOWEL: No bowel obstruction. Questionable mass in the gastric fundus.   Rectum is distended with stool.  PERITONEUM: No ascites.  VESSELS:  Within normal limits.  RETROPERITONEUM/LYMPH NODES: No lymphadenopathy.  ABDOMINAL WALL: Within normal limits.  BONES: Acute appearing fracture of the right inferior pubic ramus.   Nondisplaced fracture involving the right anterior acetabulum.    IMPRESSION: A questionable gastric mass; recommend dual phase CT and GI   consultation.    Rectum distended by stool.    Airspace opacities in the lungs possibly infection.    Acute appearing fracture of the right inferior pubic ramus.Nondisplaced   fracture involving the right anterior acetabulum.    --- End of Report ---                             Date/Time Patient Seen:  		  Referring MD:   Data Reviewed	       Patient is a 89y old  Female who presents with a chief complaint of Rectal Bleeding (30 May 2024 09:58)      Subjective/HPI   89 year old female with a history of HTN, HL, dementia who presents with rectal bleeding. The patient is unable to provide additional history. She was noted to have rectal bleeding at NH.  PAST MEDICAL & SURGICAL HISTORY:  Dementia    HTN (hypertension)    HLD (hyperlipidemia)          Medication list         MEDICATIONS  (STANDING):  donepezil 10 milliGRAM(s) Oral at bedtime  lactated ringers. 1000 milliLiter(s) (75 mL/Hr) IV Continuous <Continuous>  lactulose Syrup 30 Gram(s) Oral daily  pantoprazole  Injectable 40 milliGRAM(s) IV Push two times a day  piperacillin/tazobactam IVPB.. 3.375 Gram(s) IV Intermittent every 12 hours  polyethylene glycol 3350 17 Gram(s) Oral daily  senna 2 Tablet(s) Oral at bedtime    MEDICATIONS  (PRN):  bisacodyl Suppository 10 milliGRAM(s) Rectal daily PRN Constipation  PAST MEDICAL/SURGICAL/FAMILY/SOCIAL HISTORY:    Past Medical, Past Surgical, and Family History:  PAST MEDICAL HISTORY:  Dementia     HLD (hyperlipidemia)     HTN (hypertension).     Tobacco Usage:  · Tobacco Usage	Unknown if ever smoked    ALLERGIES AND HOME MEDICATIONS:   Allergies:        Allergies:  	No Known Allergies:     Home Medications:   * Incomplete Medication History as of 01-Sep-2022 13:11 documented in Structured Notes  · 	acetaminophen 325 mg oral tablet: 2 tab(s) orally every 6 hours, As Needed  · 	Aspirin Enteric Coated 81 mg oral delayed release tablet: 1 tab(s) orally once a day  · 	quinapril 40 mg oral tablet: 1 tab(s) orally once a day  · 	Vitamin D3 1250 mcg (50,000 intl units) oral capsule: 1 cap(s) orally once a month on the 3rd Monday   · 	Melatonin 3 mg oral tablet: 2 tab(s) orally once a day (at bedtime)  · 	donepezil 10 mg oral tablet: 1 tab(s) orally once a day (at bedtime)    REVIEW OF SYSTEMS:    Review of Systems:  · UNABLE TO OBTAIN: Dementia         Vitals log        ICU Vital Signs Last 24 Hrs  T(C): 36.6 (30 May 2024 10:07), Max: 37.2 (29 May 2024 20:59)  T(F): 97.8 (30 May 2024 10:07), Max: 99 (29 May 2024 20:59)  HR: 114 (30 May 2024 10:07) (85 - 114)  BP: 143/75 (30 May 2024 10:07) (138/81 - 151/83)  BP(mean): --  ABP: --  ABP(mean): --  RR: 17 (30 May 2024 10:07) (17 - 18)  SpO2: 91% (30 May 2024 10:07) (91% - 95%)    O2 Parameters below as of 30 May 2024 10:07  Patient On (Oxygen Delivery Method): room air                 Input and Output:  I&O's Detail    29 May 2024 07:01  -  30 May 2024 07:00  --------------------------------------------------------  IN:    IV PiggyBack: 100 mL    Lactated Ringers: 375 mL  Total IN: 475 mL    OUT:  Total OUT: 0 mL    Total NET: 475 mL          Lab Data                        10.2   24.03 )-----------( 356      ( 30 May 2024 09:50 )             32.6     05-30    140  |  105  |  31<H>  ----------------------------<  150<H>  4.5   |  25  |  1.48<H>    Ca    8.6      30 May 2024 09:50    TPro  7.0  /  Alb  2.3<L>  /  TBili  0.9  /  DBili  x   /  AST  63<H>  /  ALT  28  /  AlkPhos  81  05-29            Review of Systems	  weakness  poor historian  ataxic gait      Objective     Physical Examination    heart s1s2  lung dc BS  head nc  head at      Pertinent Lab findings & Imaging      Janis:  NO   Adequate UO     I&O's Detail    29 May 2024 07:01  -  30 May 2024 07:00  --------------------------------------------------------  IN:    IV PiggyBack: 100 mL    Lactated Ringers: 375 mL  Total IN: 475 mL    OUT:  Total OUT: 0 mL    Total NET: 475 mL               Discussed with:     Cultures:	        Radiology    INTERPRETATION:  CLINICAL INFORMATION: fever SCT    COMPARISON: None.    CONTRAST/COMPLICATIONS:  IV Contrast: NONE  Oral Contrast: NONE  Complications: None reported at time of study completion    PROCEDURE:  CT of the Chest, Abdomen and Pelvis was performed.  Sagittal and coronal reformats were performed.    FINDINGS:  CHEST:  LUNGS AND LARGE AIRWAYS: Patent central airways. Scattered airspace   opacities, most prominent in the right lower lobe.  PLEURA: No pleural effusion.  VESSELS: Within normal limits.  HEART: Heart size is normal.  No pericardial effusion.  MEDIASTINUM AND CELESTINO: No lymphadenopathy.  CHEST WALL AND LOWER NECK: Within normal limits.    ABDOMEN AND PELVIS:  LIVER: Within normal limits.  BILE DUCTS: Normal caliber.  GALLBLADDER: Within normal limits.  SPLEEN: Within normal limits.  PANCREAS: Atrophic with a few small cysts.  ADRENALS: Within normal limits.  KIDNEYS/URETERS: A small left renal cyst.    BLADDER: Small layering calculi.  REPRODUCTIVE ORGANS: Debris is noted in the lower vaginal canal.    BOWEL: No bowel obstruction. Questionable mass in the gastric fundus.   Rectum is distended with stool.  PERITONEUM: No ascites.  VESSELS:  Within normal limits.  RETROPERITONEUM/LYMPH NODES: No lymphadenopathy.  ABDOMINAL WALL: Within normal limits.  BONES: Acute appearing fracture of the right inferior pubic ramus.   Nondisplaced fracture involving the right anterior acetabulum.    IMPRESSION: A questionable gastric mass; recommend dual phase CT and GI   consultation.    Rectum distended by stool.    Airspace opacities in the lungs possibly infection.    Acute appearing fracture of the right inferior pubic ramus.Nondisplaced   fracture involving the right anterior acetabulum.    --- End of Report ---

## 2024-05-30 NOTE — H&P ADULT - ASSESSMENT
89 year old female with a history of HTN, HL, dementia  p/w BRBPR     CT abdomen shows Rectum distended by stool.    Airspace opacities in the lungs possibly infection.    Acute appearing fracture of the right inferior pubic ramus.Nondisplaced   fracture involving the right anterior acetabulum.    Positive UA     BRBPR GI consult   UTI iv abx   ID consult called   Follow up recs       GEOGRE ??Baseline Cr   Follow up repeat BMP     HTN Hold ACE fo rnow

## 2024-05-30 NOTE — CONSULT NOTE ADULT - TREATMENT GUIDELINE COMMENT
CMO in hospital, transition back to [facility]  pt doesn't not have inpatient hospice dx/criteria

## 2024-05-30 NOTE — SOCIAL WORK PROGRESS NOTE - NSSWPROGRESSNOTE_GEN_ALL_CORE
sent updated clinical documentation to patient's long-term care skilled nursing facility from on admission ([facility] in [place]) and then spoke w/ Kati MIXON in the admissions dept who confirmed that patient has been accepted back w/ bed available for a resumption of care date/time of Saturday, 06/01/2024. Of note, admissions dept for [facility] confirmed that patient needs a 3-night hospital stay prior to returning there, as per Medicare guidelines, which is why earliest possible discharge (if medically cleared) would be Saturday, 06/01/2024. Will continue to follow.

## 2024-05-31 ENCOUNTER — TRANSCRIPTION ENCOUNTER (OUTPATIENT)
Age: 89
End: 2024-05-31

## 2024-05-31 LAB
-  AMOXICILLIN/CLAVULANIC ACID: SIGNIFICANT CHANGE UP
-  AMPICILLIN/SULBACTAM: SIGNIFICANT CHANGE UP
-  AMPICILLIN: SIGNIFICANT CHANGE UP
-  AZTREONAM: SIGNIFICANT CHANGE UP
-  CEFAZOLIN: SIGNIFICANT CHANGE UP
-  CEFEPIME: SIGNIFICANT CHANGE UP
-  CEFOXITIN: SIGNIFICANT CHANGE UP
-  CEFTRIAXONE: SIGNIFICANT CHANGE UP
-  CEFUROXIME: SIGNIFICANT CHANGE UP
-  CIPROFLOXACIN: SIGNIFICANT CHANGE UP
-  ERTAPENEM: SIGNIFICANT CHANGE UP
-  GENTAMICIN: SIGNIFICANT CHANGE UP
-  LEVOFLOXACIN: SIGNIFICANT CHANGE UP
-  MEROPENEM: SIGNIFICANT CHANGE UP
-  NITROFURANTOIN: SIGNIFICANT CHANGE UP
-  PIPERACILLIN/TAZOBACTAM: SIGNIFICANT CHANGE UP
-  TOBRAMYCIN: SIGNIFICANT CHANGE UP
-  TRIMETHOPRIM/SULFAMETHOXAZOLE: SIGNIFICANT CHANGE UP
CULTURE RESULTS: ABNORMAL
METHOD TYPE: SIGNIFICANT CHANGE UP
ORGANISM # SPEC MICROSCOPIC CNT: ABNORMAL
ORGANISM # SPEC MICROSCOPIC CNT: ABNORMAL
SPECIMEN SOURCE: SIGNIFICANT CHANGE UP

## 2024-05-31 RX ADMIN — MORPHINE SULFATE 5 MILLIGRAM(S): 50 CAPSULE, EXTENDED RELEASE ORAL at 21:16

## 2024-05-31 RX ADMIN — MORPHINE SULFATE 5 MILLIGRAM(S): 50 CAPSULE, EXTENDED RELEASE ORAL at 20:46

## 2024-05-31 RX ADMIN — PANTOPRAZOLE SODIUM 40 MILLIGRAM(S): 20 TABLET, DELAYED RELEASE ORAL at 17:36

## 2024-05-31 RX ADMIN — PANTOPRAZOLE SODIUM 40 MILLIGRAM(S): 20 TABLET, DELAYED RELEASE ORAL at 06:12

## 2024-05-31 RX ADMIN — POLYETHYLENE GLYCOL 3350 17 GRAM(S): 17 POWDER, FOR SOLUTION ORAL at 11:38

## 2024-05-31 NOTE — CAREGIVER ENGAGEMENT NOTE - CAREGIVER EDUCATION - TYPES DISCUSSED
Advanced directives/Discharge plan/DME/Hospice/Insurance benefits/Post-acute care agency contact/Post-discharge escalation process/SNF placement process/Transportation coordination/Transportation letter provided
Advanced directives/Discharge plan/DME/Insurance benefits/Post-acute care agency contact/Post-discharge escalation process/SNF placement process/Transportation coordination/Transportation letter provided

## 2024-05-31 NOTE — CHART NOTE - NSCHARTNOTEFT_GEN_A_CORE
Chart reviewed, no PRN meds for symptoms utilized since initiation.  Plan for d/c back to [facility], likely 6/1, for CMO.  Ursula Collazo MD, [facility]; Palliative Care Attending, Ethicist. [phone]

## 2024-05-31 NOTE — CAREGIVER ENGAGEMENT NOTE - CAREGIVER EDUCATION NOTES - FREE TEXT
Per discussion w/ inpatient interdisciplinary treatment team this AM, patient is medically cleared for transition to next level of care today, 05/31/2024.  sent updated clinical documentation to patient's long-term care skilled nursing facility, [facility] in [place], and then spoke w/ Kati MIXON in the admissions dept who confirmed that patient has been accepted back w/ bed available for a resumption of care date/time of Saturday, 06/01/2024, @ 10:00 as patient needs a 3-night hospital stay as per Medicare guidelines. Next,  requested non-emergent ambulance for discharge transport via sending electronic referral to [facility] -- trip assigned to transport provider DAVIS @ [phone]. Of note, no pre-authorization required for long-term care skilled nursing facility nor non-emergent ambulance as patient's primary health insurance is Medicare. Afterward,  spoke w/ patient's son, Mr. Guy, @ cell [phone] who confirmed to be understanding of and agreeable to the above discharge plan.    Attending MD [place] & unit RN Barb both aware. Packet of clinical information, including non-emergent ambulance transport certification form & original pink MOLST form, left in RN station on [address] to accompany patient to receiving facility.  to remain available for any continued needs.
Mrs. Eugenie Casarez is an 89y/o , domiciled white female, w/ PMHx as indicated below, who presents to [facility] secondary to GI bleed. Given patient's baseline impaired cognition,  secured collateral information from patient's son Al via phone call for completion of care coordination assessment. Per son, patient has resided @ [facility] in [place] since August 2021, and she is typically able to attend to her activities of daily living (ADLs) w/o the use of any durable medical equipment as she is ambulatory on her own w/o any devices or assist. Mrs. Casarez actually typically wears a wander guard bracelet as she has tried to leave the facility w/ patient's other son during visiting hours. Son identifies that patient had 3 falls about 2wks ago but was not sent to hospital and seemed not to be in any pain, though he acknowledged that her dementia likely affects her ability to express those kinds of needs. Patient has been very very confused since 2021 and often does not recognize her family members at all. He reports to be agreeable to patient returning to [facility] on discharge if that remains the most appropriate level of care for her.    Pink MOLST dated 06/05/2023 indicating the following was sent to hospital w/ patient: "Do not attempt resuscitation (DNR); do not intubate (DNI) and do not use non-invasive ventilation or mechanical ventilation; send to hospital only if pain & severe symptoms cannot be controlled; limited medical interventions only as described below; no feeding tube; determine use or limitation of IV fluids as need arises; use antibiotics to treat infections; determine use or limitation if renal failure occurs."  spoke w/ patient's son, Mr. Guy, @ cell [phone] who confirmed that these remain his wishes for patient's Advance Directives. New original pink MOLST was completed & placed in hospital chart as the initial MOLST was missing a physician signature in sections E & H.

## 2024-05-31 NOTE — SOCIAL WORK PROGRESS NOTE - NSSWPROGRESSNOTE_GEN_ALL_CORE
Per discussion w/ inpatient interdisciplinary treatment team this AM, patient is medically cleared for transition to next level of care today, 05/31/2024.  sent updated clinical documentation to patient's long-term care skilled nursing facility, [facility] in [place], and then spoke w/ Kati MIXON in the admissions dept who confirmed that patient has been accepted back w/ bed available for a resumption of care date/time of Saturday, 06/01/2024, @ 10:00 as patient needs a 3-night hospital stay as per Medicare guidelines. Next,  requested non-emergent ambulance for discharge transport via sending electronic referral to [facility] -- trip assigned to transport provider DAVIS @ [phone]. Of note, no pre-authorization required for long-term care skilled nursing facility nor non-emergent ambulance as patient's primary health insurance is Medicare. Afterward,  spoke w/ patient's son, Mr. Guy, @ cell [phone] who confirmed to be understanding of and agreeable to the above discharge plan.    Attending MD [place] & unit RN Barb both aware. Packet of clinical information, including non-emergent ambulance transport certification form & original pink MOLST form, left in RN station on [address] to accompany patient to receiving facility.  to remain available for any continued needs.

## 2024-05-31 NOTE — DISCHARGE NOTE NURSING/CASE MANAGEMENT/SOCIAL WORK - NSDCVIVACCINE_GEN_ALL_CORE_FT
Tdap; 01-Sep-2022 12:56; María Stahl (RN); [employer]; [id number] (Exp. Date: 18-Jan-2024); IntraMuscular; Deltoid Left.; 0.5 milliLiter(s); VIS (VIS Published: 09-May-2013, VIS Presented: 01-Sep-2022);

## 2024-06-01 ENCOUNTER — TRANSCRIPTION ENCOUNTER (OUTPATIENT)
Age: 89
End: 2024-06-01

## 2024-06-01 VITALS
OXYGEN SATURATION: 93 % | HEART RATE: 98 BPM | RESPIRATION RATE: 18 BRPM | TEMPERATURE: 98 F | DIASTOLIC BLOOD PRESSURE: 60 MMHG | SYSTOLIC BLOOD PRESSURE: 110 MMHG

## 2024-06-01 PROCEDURE — 85730 THROMBOPLASTIN TIME PARTIAL: CPT

## 2024-06-01 PROCEDURE — 96375 TX/PRO/DX INJ NEW DRUG ADDON: CPT

## 2024-06-01 PROCEDURE — 80048 BASIC METABOLIC PNL TOTAL CA: CPT

## 2024-06-01 PROCEDURE — 96365 THER/PROPH/DIAG IV INF INIT: CPT

## 2024-06-01 PROCEDURE — 81001 URINALYSIS AUTO W/SCOPE: CPT

## 2024-06-01 PROCEDURE — 87186 SC STD MICRODIL/AGAR DIL: CPT

## 2024-06-01 PROCEDURE — 82272 OCCULT BLD FECES 1-3 TESTS: CPT

## 2024-06-01 PROCEDURE — 80053 COMPREHEN METABOLIC PANEL: CPT

## 2024-06-01 PROCEDURE — 87040 BLOOD CULTURE FOR BACTERIA: CPT

## 2024-06-01 PROCEDURE — 83605 ASSAY OF LACTIC ACID: CPT

## 2024-06-01 PROCEDURE — 87086 URINE CULTURE/COLONY COUNT: CPT

## 2024-06-01 PROCEDURE — 87077 CULTURE AEROBIC IDENTIFY: CPT

## 2024-06-01 PROCEDURE — 36415 COLL VENOUS BLD VENIPUNCTURE: CPT

## 2024-06-01 PROCEDURE — 99285 EMERGENCY DEPT VISIT HI MDM: CPT | Mod: 25

## 2024-06-01 PROCEDURE — 93005 ELECTROCARDIOGRAM TRACING: CPT

## 2024-06-01 PROCEDURE — 85025 COMPLETE CBC W/AUTO DIFF WBC: CPT

## 2024-06-01 PROCEDURE — 71045 X-RAY EXAM CHEST 1 VIEW: CPT

## 2024-06-01 PROCEDURE — 71250 CT THORAX DX C-: CPT | Mod: MC

## 2024-06-01 PROCEDURE — 85610 PROTHROMBIN TIME: CPT

## 2024-06-01 PROCEDURE — 87637 SARSCOV2&INF A&B&RSV AMP PRB: CPT

## 2024-06-01 PROCEDURE — 74176 CT ABD & PELVIS W/O CONTRAST: CPT | Mod: MC

## 2024-06-01 RX ORDER — QUINAPRIL HYDROCHLORIDE 40 MG/1
1 TABLET, FILM COATED ORAL
Qty: 0 | Refills: 0 | DISCHARGE

## 2024-06-01 RX ORDER — CHOLECALCIFEROL (VITAMIN D3) 125 MCG
1 CAPSULE ORAL
Qty: 0 | Refills: 0 | DISCHARGE

## 2024-06-01 RX ORDER — ASPIRIN/CALCIUM CARB/MAGNESIUM 324 MG
1 TABLET ORAL
Qty: 0 | Refills: 0 | DISCHARGE

## 2024-06-01 RX ORDER — ATROPINE SULFATE 1 %
0 DROPS OPHTHALMIC (EYE)
Qty: 0 | Refills: 0 | DISCHARGE
Start: 2024-06-01

## 2024-06-01 RX ORDER — LANOLIN ALCOHOL/MO/W.PET/CERES
2 CREAM (GRAM) TOPICAL
Qty: 0 | Refills: 0 | DISCHARGE

## 2024-06-01 RX ORDER — DONEPEZIL HYDROCHLORIDE 10 MG/1
1 TABLET, FILM COATED ORAL
Qty: 0 | Refills: 0 | DISCHARGE

## 2024-06-01 RX ORDER — POLYETHYLENE GLYCOL 3350 17 G/17G
17 POWDER, FOR SOLUTION ORAL
Qty: 0 | Refills: 0 | DISCHARGE
Start: 2024-06-01

## 2024-06-01 RX ORDER — SENNA PLUS 8.6 MG/1
2 TABLET ORAL
Qty: 0 | Refills: 0 | DISCHARGE
Start: 2024-06-01

## 2024-06-01 RX ORDER — MORPHINE SULFATE 50 MG/1
2.5 CAPSULE, EXTENDED RELEASE ORAL
Qty: 0 | Refills: 0 | DISCHARGE
Start: 2024-06-01

## 2024-06-01 RX ADMIN — MORPHINE SULFATE 5 MILLIGRAM(S): 50 CAPSULE, EXTENDED RELEASE ORAL at 02:45

## 2024-06-01 RX ADMIN — PANTOPRAZOLE SODIUM 40 MILLIGRAM(S): 20 TABLET, DELAYED RELEASE ORAL at 05:50

## 2024-06-01 RX ADMIN — MORPHINE SULFATE 5 MILLIGRAM(S): 50 CAPSULE, EXTENDED RELEASE ORAL at 02:14

## 2024-06-01 NOTE — DISCHARGE NOTE PROVIDER - NSDCCPCAREPLAN_GEN_ALL_CORE_FT
PRINCIPAL DISCHARGE DIAGNOSIS  Diagnosis: Acute UTI  Assessment and Plan of Treatment: started on abx, and discontinued   Family opted for comfort measures      SECONDARY DISCHARGE DIAGNOSES  Diagnosis: Lung infection  Assessment and Plan of Treatment:

## 2024-06-01 NOTE — PROGRESS NOTE ADULT - PROVIDER SPECIALTY LIST ADULT
Cardiology
Gastroenterology
Pulmonology
Infectious Disease
Cardiology
Hospitalist
Gastroenterology
Gastroenterology
Pulmonology

## 2024-06-01 NOTE — DISCHARGE NOTE PROVIDER - CARE PROVIDER_API CALL
Brown Whitt  [address] [address]  Phone: [phone]  Fax: [phone]  Follow Up Time:
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
## 2024-06-01 NOTE — PROGRESS NOTE ADULT - ASSESSMENT
89 year old female with a history of HTN, HLD, dementia who presents with rectal bleeding. The patient is unable to provide additional history. She was noted to have rectal bleeding at NH.    dementia  OP  OA  HTN  HLD  Anemia  pelvic fx  ataxic gait    ct imaging reviewed - ? gastric mass and ? airspace opacities  pall eval noted  GOC - DNR DNI  Pall measures in effect  oral hygiene  skin care  pain rx regimen  assist with needs  fall prec
The patient is an 89 year old female with a history of HTN, HL, dementia who presents with rectal bleeding.    Plan:  - ECG with sinus tachycardia  - CT chest with possible PNA  - CT A/P with possible gastric mass  - Hold quinapril due to GEORGE  - Discontinue aspirin  - Comfort measures  - Pall care follow-up
gi bleed    plan  GOC noted  Now on comfort care measures  Will sign off, please reconsult if needed.  
gi bleed    plan  GOC noted  Now on comfort care measures  Will sign off, please reconsult if needed.    Discussed with Dr. Moraes. 
89 year old female with a history of HTN, HLD, dementia who presents with rectal bleeding. The patient is unable to provide additional history. She was noted to have rectal bleeding at NH.    dementia  OP  OA  HTN  HLD  Anemia  pelvic fx  ataxic gait    ct imaging reviewed - ? gastric mass and ? airspace opacities  pall eval noted  GOC - DNR DNI  Pall measures in effect  oral hygiene  skin care  pain rx regimen  assist with needs  fall prec
gi bleed    plan  follow up ua  ct scan a/p shows a gastric mass  needs goc conversation with the patient hcp, she is demented and likely not a candidate for surgery or ctx if present  ppi once a day  bowel regimen to be started  adv diet to clears    Advanced care planning was discussed with patient and family.  Advanced care planning forms were reviewed and discussed.  Risks, benefits and alternatives of gastroenterologic procedures were discussed in detail and all questions were answered.    30 minutes spent.    Discussed with Dr. Moraes. 
The patient is an 89 year old female with a history of HTN, HL, dementia who presents with rectal bleeding.    Plan:  - ECG with sinus tachycardia  - CT chest with possible PNA  - CT A/P with possible gastric mass  - Hold quinapril due to GEORGE  - Discontinue aspirin  - Comfort measures  - Pall care follow-up  - Will sign off
  89YO F resident of [facility] PMH Dementia HTN, HLD admitted with GIB and UTI  In ED Tmax 100.2 WBC 17K with bandemia. Noted mohamud. Ua with pyuria,   Made comfort care       [facility] GI consulted   No intervention   Family opted Hospice care   UTI off abx     Comfort care

## 2024-06-01 NOTE — DISCHARGE NOTE PROVIDER - NSDCMRMEDTOKEN_GEN_ALL_CORE_FT
acetaminophen 325 mg oral tablet: 2 tab(s) orally every 6 hours, As Needed  atropine 1% ophthalmic solution: to each affected eye once a day  LORazepam 2 mg/mL oral concentrate: orally every 4 hours as needed for  agitation  morphine 10 mg/5 mL oral solution: 2.5 milliliter(s) orally every 4 hours As needed dyspnea  polyethylene glycol 3350 oral powder for reconstitution: 17 gram(s) orally once a day  senna leaf extract oral tablet: 2 tab(s) orally once a day (at bedtime)

## 2024-06-01 NOTE — PROGRESS NOTE ADULT - SUBJECTIVE AND OBJECTIVE BOX
MARVIN JOHNSON is a 89yFemale , patient examined and chart reviewed.    INTERVAL HPI/ OVERNIGHT EVENTS:   Lethargic. Afebrile    PAST MEDICAL & SURGICAL HISTORY:  Dementia  HTN (hypertension)  HLD (hyperlipidemia)          For details regarding the patient's social history, family history, and other miscellaneous elements, please refer the initial infectious diseases consultation and/or the admitting history and physical examination for this admission.    ROS:  Unable to obtain due to : pt's condition    No Known Allergies      Current inpatient medications :    ANTIBIOTICS/RELEVANT:      atropine 1% Ophthalmic Solution for SubLingual Use 2 Drop(s) SubLingual every 6 hours PRN  donepezil 10 milliGRAM(s) Oral at bedtime  LORazepam    Concentrate 0.5 milliGRAM(s) Oral every 4 hours PRN  morphine   Solution 5 milliGRAM(s) Oral every 4 hours PRN  morphine   Solution 5 milliGRAM(s) Oral every 4 hours PRN  pantoprazole  Injectable 40 milliGRAM(s) IV Push two times a day  polyethylene glycol 3350 17 Gram(s) Oral daily  senna 2 Tablet(s) Oral at bedtime      Objective:    05-30 @ 07:01  -  05-31 @ 07:00  --------------------------------------------------------  IN: 525 mL / OUT: 0 mL / NET: 525 mL      T(C): 36.7 (05-31-24 @ 14:36), Max: 37.7 (05-30-24 @ 21:06)  HR: 104 (05-31-24 @ 14:36) (91 - 104)  BP: 135/81 (05-31-24 @ 14:36) (135/81 - 149/83)  RR: 18 (05-31-24 @ 14:36) (18 - 18)  SpO2: 93% (05-31-24 @ 14:36) (93% - 95%)      Physical Exam:  General: lethargic  Neck: supple, trachea midline  Lungs: decreased no wheeze/rhonchi  Cardiovascular: regular rate and rhythm, S1 S2  Abdomen: soft, nontender,  bowel sounds normal  Neurological: lethargic  Skin: no rash  Extremities: no edema    LABS:                          10.2   24.03 )-----------( 356      ( 30 May 2024 09:50 )             32.6       05-30    140  |  105  |  31<H>  ----------------------------<  150<H>  4.5   |  25  |  1.48<H>    Ca    8.6      30 May 2024 09:50      MICROBIOLOGY:    Culture - Urine (collected 29 May 2024 08:50)  Source: Clean Catch Clean Catch (Midstream)  Final Report (31 May 2024 17:43):    >100,000 CFU/ml Proteus mirabilis  Organism: Proteus mirabilis (31 May 2024 17:43)  Organism: Proteus mirabilis (31 May 2024 17:43)      Method Type: CAROLYN      -  Amoxicillin/Clavulanic Acid: S <=8/4      -  Ampicillin: S <=8 These ampicillin results predict results for amoxicillin      -  Ampicillin/Sulbactam: S <=4/2      -  Aztreonam: S <=4      -  Cefazolin: S <=2 For uncomplicated UTI with K. pneumoniae, E. coli, or P. mirablis: CAROLYN <=16 is sensitive and CAROLYN >=32 is resistant. This also predicts results for oral agents cefaclor, cefdinir, cefpodoxime, cefprozil, cefuroxime axetil, cephalexin and locarbef for uncomplicated UTI. Note that some isolates may be susceptible to these agents while testing resistant to cefazolin.      -  Cefepime: S <=2      -  Cefoxitin: S <=8      -  Ceftriaxone: S <=1      -  Cefuroxime: S <=4      -  Ciprofloxacin: S <=0.25      -  Ertapenem: S <=0.5      -  Gentamicin: S <=2      -  Levofloxacin: S <=0.5      -  Meropenem: S <=1      -  Nitrofurantoin: R >64 Should not be used to treat pyelonephritis      -  Piperacillin/Tazobactam: S <=8      -  Tobramycin: S <=2      -  Trimethoprim/Sulfamethoxazole: S <=0.5/9.5    Culture - Blood (collected 29 May 2024 08:30)  Source: .Blood Blood-Peripheral  Preliminary Report (31 May 2024 13:01):    No growth at 48 Hours    Culture - Blood (collected 29 May 2024 08:30)  Source: .Blood Blood-Peripheral  Preliminary Report (31 May 2024 13:01):    No growth at 48 Hours      RADIOLOGY & ADDITIONAL STUDIES:        Assessment :  88[facility], HLD admitted with GIB and UTI  In ED Tmax 100.2 WBC 17K with bandemia. Noted mohamud. Ua with pyuria,   Made comfort care     Plan :   Comfort care   Will sign off case      Continue with present regiment.  Appropriate use of antibiotics and adverse effects reviewed.      > 35 minutes were spent in direct patient care reviewing notes, medications ,labs data/ imaging , discussion with multidisciplinary team.    Thank you for allowing me to participate in care of your patient .    Marguerite Torres MD  Infectious Disease  [phone]
Chief Complaint: Rectal bleeding    Interval Events: No events overnight.    Review of Systems:  General: No fevers, chills, weight gain  Skin: No rashes, color changes  Cardiovascular: No chest pain, orthopnea  Respiratory: No shortness of breath, cough  Gastrointestinal: No nausea, abdominal pain  Genitourinary: No incontinence, pain with urination  Musculoskeletal: No pain, swelling, decreased range of motion  Neurological: No headache, weakness  Psychiatric: No depression, anxiety  Endocrine: No weight gain, increased thirst  All other systems are comprehensively negative.    Physical Exam:  Vital Signs Last 24 Hrs  T(C): 36.8 (01 Jun 2024 05:45), Max: 37.2 (31 May 2024 21:10)  T(F): 98.2 (01 Jun 2024 05:45), Max: 98.9 (31 May 2024 21:10)  HR: 98 (01 Jun 2024 05:45) (95 - 104)  BP: 110/60 (01 Jun 2024 05:45) (110/60 - 156/72)  BP(mean): --  RR: 18 (01 Jun 2024 05:45) (18 - 18)  SpO2: 93% (01 Jun 2024 05:45) (93% - 95%)  Parameters below as of 01 Jun 2024 05:45  Patient On (Oxygen Delivery Method): room air  General: NAD  HEENT: MMM  Neck: No JVD, no carotid bruit  Lungs: CTAB  CV: RRR, nl S1/S2, no M/R/G  Abdomen: S/NT/ND, +BS  Extremities: No LE edema, no cyanosis  Neuro: AAOx0  Skin: No rash    Labs:        ECG/Telemetry: Sinus rhythm
Chief Complaint: Rectal bleeding    Interval Events: No events overnight.    Review of Systems:  General: No fevers, chills, weight gain  Skin: No rashes, color changes  Cardiovascular: No chest pain, orthopnea  Respiratory: No shortness of breath, cough  Gastrointestinal: No nausea, abdominal pain  Genitourinary: No incontinence, pain with urination  Musculoskeletal: No pain, swelling, decreased range of motion  Neurological: No headache, weakness  Psychiatric: No depression, anxiety  Endocrine: No weight gain, increased thirst  All other systems are comprehensively negative.    Physical Exam:  Vitals:        Vital Signs Last 24 Hrs  T(C): 37.6 (31 May 2024 04:45), Max: 37.7 (30 May 2024 21:06)  T(F): 99.6 (31 May 2024 04:45), Max: 99.9 (30 May 2024 21:06)  HR: 91 (31 May 2024 04:45) (91 - 99)  BP: 149/83 (31 May 2024 04:45) (136/80 - 149/83)  BP(mean): --  RR: 18 (31 May 2024 04:45) (18 - 18)  SpO2: 95% (31 May 2024 04:45) (90% - 95%)  Parameters below as of 31 May 2024 04:45  Patient On (Oxygen Delivery Method): room air  General: NAD  HEENT: MMM  Neck: No JVD, no carotid bruit  Lungs: CTAB  CV: RRR, nl S1/S2, no M/R/G  Abdomen: S/NT/ND, +BS  Extremities: No LE edema, no cyanosis  Neuro: AAOx0  Skin: No rash    Labs:                        10.2   24.03 )-----------( 356      ( 30 May 2024 09:50 )             32.6     05-30    140  |  105  |  31<H>  ----------------------------<  150<H>  4.5   |  25  |  1.48<H>    Ca    8.6      30 May 2024 09:50              ECG/Telemetry: Sinus rhythm
HPI:  Patient seen and examined last night   89 year old female with a history of HTN, HL, dementia who presents with rectal bleeding. The patient is unable to provide additional history. She was noted to have rectal bleeding at NH.   (30 May 2024 08:57)      PAST MEDICAL & SURGICAL HISTORY:  Dementia      HTN (hypertension)      HLD (hyperlipidemia)          Review of Systems: Unable to obtain   Allergies    No Known Allergies    Intolerances        Social History:     FAMILY HISTORY:      MEDICATIONS  (STANDING):  donepezil 10 milliGRAM(s) Oral at bedtime  pantoprazole  Injectable 40 milliGRAM(s) IV Push two times a day  polyethylene glycol 3350 17 Gram(s) Oral daily  senna 2 Tablet(s) Oral at bedtime    MEDICATIONS  (PRN):  atropine 1% Ophthalmic Solution for SubLingual Use 2 Drop(s) SubLingual every 6 hours PRN secretions  LORazepam    Concentrate 0.5 milliGRAM(s) Oral every 4 hours PRN anxiety, agitation, refractory dyspnea  morphine   Solution 5 milliGRAM(s) Oral every 4 hours PRN Severe Pain (7 - 10)  morphine   Solution 5 milliGRAM(s) Oral every 4 hours PRN dyspnea        CAPILLARY BLOOD GLUCOSE        I&O's Summary    30 May 2024 07:01  -  31 May 2024 07:00  --------------------------------------------------------  IN: 525 mL / OUT: 0 mL / NET: 525 mL        PHYSICAL EXAM:  GENERAL: NAD, well-developed  HEAD:  Atraumatic, Normocephalic  EYES: EOMI, conjunctiva and sclera clear  NECK: Supple, No JVD  CHEST/LUNG: Clear to auscultation bilaterally; No wheeze  HEART: Regular rate and rhythm; No murmurs, rubs, or gallops  ABDOMEN: Soft, Nontender, Nondistended; Bowel sounds present  EXTREMITIES:  2+ Peripheral Pulses, No clubbing, cyanosis, or edema  PSYCH: AAOx1   NEUROLOGY: non-focal  SKIN: No rashes or lesions    LABS:                        10.2   24.03 )-----------( 356      ( 30 May 2024 09:50 )             32.6     05-30    140  |  105  |  31<H>  ----------------------------<  150<H>  4.5   |  25  |  1.48<H>    Ca    8.6      30 May 2024 09:50            Urinalysis Basic - ( 30 May 2024 09:50 )    Color: x / Appearance: x / SG: x / pH: x  Gluc: 150 mg/dL / Ketone: x  / Bili: x / Urobili: x   Blood: x / Protein: x / Nitrite: x   Leuk Esterase: x / RBC: x / WBC x   Sq Epi: x / Non Sq Epi: x / Bacteria: x        RADIOLOGY & ADDITIONAL TESTS:    Imaging Personally Reviewed:    Consultant(s) Notes Reviewed:      Care Discussed with Consultants/Other Providers:  
Date/Time Patient Seen:  		  Referring MD:   Data Reviewed	       Patient is a 89y old  Female who presents with a chief complaint of Rectal Bleeding (30 May 2024 14:20)      Subjective/HPI     PAST MEDICAL & SURGICAL HISTORY:  Dementia    HTN (hypertension)    HLD (hyperlipidemia)          Medication list         MEDICATIONS  (STANDING):  donepezil 10 milliGRAM(s) Oral at bedtime  pantoprazole  Injectable 40 milliGRAM(s) IV Push two times a day  polyethylene glycol 3350 17 Gram(s) Oral daily  senna 2 Tablet(s) Oral at bedtime    MEDICATIONS  (PRN):  atropine 1% Ophthalmic Solution for SubLingual Use 2 Drop(s) SubLingual every 6 hours PRN secretions  LORazepam    Concentrate 0.5 milliGRAM(s) Oral every 4 hours PRN anxiety, agitation, refractory dyspnea  morphine   Solution 5 milliGRAM(s) Oral every 4 hours PRN Severe Pain (7 - 10)  morphine   Solution 5 milliGRAM(s) Oral every 4 hours PRN dyspnea         Vitals log        ICU Vital Signs Last 24 Hrs  T(C): 37.6 (31 May 2024 04:45), Max: 37.7 (30 May 2024 21:06)  T(F): 99.6 (31 May 2024 04:45), Max: 99.9 (30 May 2024 21:06)  HR: 91 (31 May 2024 04:45) (91 - 114)  BP: 149/83 (31 May 2024 04:45) (136/80 - 151/83)  BP(mean): --  ABP: --  ABP(mean): --  RR: 18 (31 May 2024 04:45) (17 - 18)  SpO2: 95% (31 May 2024 04:45) (90% - 95%)    O2 Parameters below as of 31 May 2024 04:45  Patient On (Oxygen Delivery Method): room air                 Input and Output:  I&O's Detail    29 May 2024 07:01  -  30 May 2024 07:00  --------------------------------------------------------  IN:    IV PiggyBack: 100 mL    Lactated Ringers: 375 mL  Total IN: 475 mL    OUT:  Total OUT: 0 mL    Total NET: 475 mL      30 May 2024 07:01  -  31 May 2024 05:13  --------------------------------------------------------  IN:    Lactated Ringers: 525 mL  Total IN: 525 mL    OUT:  Total OUT: 0 mL    Total NET: 525 mL          Lab Data                        10.2   24.03 )-----------( 356      ( 30 May 2024 09:50 )             32.6     05-30    140  |  105  |  31<H>  ----------------------------<  150<H>  4.5   |  25  |  1.48<H>    Ca    8.6      30 May 2024 09:50    TPro  7.0  /  Alb  2.3<L>  /  TBili  0.9  /  DBili  x   /  AST  63<H>  /  ALT  28  /  AlkPhos  81  05-29            Review of Systems	      Objective     Physical Examination    heart s1s2  lung dc BS  head nc      Pertinent Lab findings & Imaging      Janis:  NO   Adequate UO     I&O's Detail    29 May 2024 07:01  -  30 May 2024 07:00  --------------------------------------------------------  IN:    IV PiggyBack: 100 mL    Lactated Ringers: 375 mL  Total IN: 475 mL    OUT:  Total OUT: 0 mL    Total NET: 475 mL      30 May 2024 07:01  -  31 May 2024 05:13  --------------------------------------------------------  IN:    Lactated Ringers: 525 mL  Total IN: 525 mL    OUT:  Total OUT: 0 mL    Total NET: 525 mL               Discussed with:     Cultures:	        Radiology                            
Date/Time Patient Seen:  		  Referring MD:   Data Reviewed	       Patient is a 89y old  Female who presents with a chief complaint of Rectal Bleeding (31 May 2024 19:13)      Subjective/HPI     PAST MEDICAL & SURGICAL HISTORY:  Dementia    HTN (hypertension)    HLD (hyperlipidemia)          Medication list         MEDICATIONS  (STANDING):  donepezil 10 milliGRAM(s) Oral at bedtime  pantoprazole  Injectable 40 milliGRAM(s) IV Push two times a day  polyethylene glycol 3350 17 Gram(s) Oral daily  senna 2 Tablet(s) Oral at bedtime    MEDICATIONS  (PRN):  atropine 1% Ophthalmic Solution for SubLingual Use 2 Drop(s) SubLingual every 6 hours PRN secretions  LORazepam    Concentrate 0.5 milliGRAM(s) Oral every 4 hours PRN anxiety, agitation, refractory dyspnea  morphine   Solution 5 milliGRAM(s) Oral every 4 hours PRN Severe Pain (7 - 10)  morphine   Solution 5 milliGRAM(s) Oral every 4 hours PRN dyspnea         Vitals log        ICU Vital Signs Last 24 Hrs  T(C): 37.2 (31 May 2024 21:10), Max: 37.2 (31 May 2024 21:10)  T(F): 98.9 (31 May 2024 21:10), Max: 98.9 (31 May 2024 21:10)  HR: 95 (31 May 2024 21:10) (95 - 104)  BP: 156/72 (31 May 2024 21:10) (135/81 - 156/72)  BP(mean): --  ABP: --  ABP(mean): --  RR: 18 (31 May 2024 21:10) (18 - 18)  SpO2: 95% (31 May 2024 21:10) (93% - 95%)    O2 Parameters below as of 31 May 2024 21:10  Patient On (Oxygen Delivery Method): room air                 Input and Output:  I&O's Detail    30 May 2024 07:01  -  31 May 2024 07:00  --------------------------------------------------------  IN:    Lactated Ringers: 525 mL  Total IN: 525 mL    OUT:  Total OUT: 0 mL    Total NET: 525 mL          Lab Data                        10.2   24.03 )-----------( 356      ( 30 May 2024 09:50 )             32.6     05-30    140  |  105  |  31<H>  ----------------------------<  150<H>  4.5   |  25  |  1.48<H>    Ca    8.6      30 May 2024 09:50              Review of Systems	      Objective     Physical Examination    heart s1s2  lung dc bS  head nc      Pertinent Lab findings & Imaging      Janis:  NO   Adequate UO     I&O's Detail    30 May 2024 07:01  -  31 May 2024 07:00  --------------------------------------------------------  IN:    Lactated Ringers: 525 mL  Total IN: 525 mL    OUT:  Total OUT: 0 mL    Total NET: 525 mL               Discussed with:     Cultures:	        Radiology                            
INTERVAL HPI/OVERNIGHT EVENTS:  HPI:  Overnight events noted. GOC noted, now comfort care measures.    MEDICATIONS  (STANDING):  donepezil 10 milliGRAM(s) Oral at bedtime  pantoprazole  Injectable 40 milliGRAM(s) IV Push two times a day  polyethylene glycol 3350 17 Gram(s) Oral daily  senna 2 Tablet(s) Oral at bedtime    MEDICATIONS  (PRN):  atropine 1% Ophthalmic Solution for SubLingual Use 2 Drop(s) SubLingual every 6 hours PRN secretions  LORazepam    Concentrate 0.5 milliGRAM(s) Oral every 4 hours PRN anxiety, agitation, refractory dyspnea  morphine   Solution 5 milliGRAM(s) Oral every 4 hours PRN Severe Pain (7 - 10)  morphine   Solution 5 milliGRAM(s) Oral every 4 hours PRN dyspnea      Allergies    No Known Allergies    Intolerances      Unable to obtain ROS due to status.      PHYSICAL EXAM:   Vital Signs:  Vital Signs Last 24 Hrs  T(C): 37.6 (31 May 2024 04:45), Max: 37.7 (30 May 2024 21:06)  T(F): 99.6 (31 May 2024 04:45), Max: 99.9 (30 May 2024 21:06)  HR: 91 (31 May 2024 04:45) (91 - 114)  BP: 149/83 (31 May 2024 04:45) (136/80 - 149/83)  BP(mean): --  RR: 18 (31 May 2024 04:45) (17 - 18)  SpO2: 95% (31 May 2024 04:45) (90% - 95%)    Parameters below as of 31 May 2024 04:45  Patient On (Oxygen Delivery Method): room air      Daily     Daily I&O's Summary    30 May 2024 07:01  -  31 May 2024 07:00  --------------------------------------------------------  IN: 525 mL / OUT: 0 mL / NET: 525 mL        GENERAL:  Appears stated age, well-groomed, well-nourished, no distress  HEENT:  NC/AT,  conjunctivae clear and pink, no thyromegaly, nodules, adenopathy, no JVD, sclera -anicteric  CHEST:  Full & symmetric excursion, no increased effort, breath sounds clear  HEART:  Regular rhythm, S1, S2, no murmur/rub/S3/S4, no abdominal bruit, no edema  ABDOMEN:  Soft, non-tender, non-distended, normoactive bowel sounds,  no masses ,no hepato-splenomegaly, no signs of chronic liver disease  EXTEREMITIES:  no cyanosis,clubbing or edema  SKIN:  No rash/erythema/ecchymoses/petechiae/wounds/abscess/warm/dry  NEURO:  Alert, no asterixis, no tremor, no encephalopathy      LABS:                        10.2   24.03 )-----------( 356      ( 30 May 2024 09:50 )             32.6     05-30    140  |  105  |  31<H>  ----------------------------<  150<H>  4.5   |  25  |  1.48<H>    Ca    8.6      30 May 2024 09:50        Urinalysis Basic - ( 30 May 2024 09:50 )    Color: x / Appearance: x / SG: x / pH: x  Gluc: 150 mg/dL / Ketone: x  / Bili: x / Urobili: x   Blood: x / Protein: x / Nitrite: x   Leuk Esterase: x / RBC: x / WBC x   Sq Epi: x / Non Sq Epi: x / Bacteria: x      amylase   lipase  RADIOLOGY & ADDITIONAL TESTS:  
INTERVAL HPI/OVERNIGHT EVENTS:  No new overnight event.  No N/V/D.  Tolerating diet.    Allergies    No Known Allergies    Intolerances    General:  No wt loss, fevers, chills, night sweats, fatigue,   Eyes:  Good vision, no reported pain  ENT:  No sore throat, pain, runny nose, dysphagia  CV:  No pain, palpitations, hypo/hypertension  Resp:  No dyspnea, cough, tachypnea, wheezing  GI:  No pain, No nausea, No vomiting, No diarrhea, No constipation, No weight loss, No fever, No pruritis, No rectal bleeding, No tarry stools, No dysphagia,  :  No pain, bleeding, incontinence, nocturia  Muscle:  No pain, weakness  Neuro:  No weakness, tingling, memory problems  Psych:  No fatigue, insomnia, mood problems, depression  Endocrine:  No polyuria, polydipsia, cold/heat intolerance  Heme:  No petechiae, ecchymosis, easy bruisability  Skin:  No rash, tattoos, scars, edema      PHYSICAL EXAM:   Vital Signs:  Vital Signs Last 24 Hrs  T(C): 36.8 (01 Jun 2024 05:45), Max: 37.2 (31 May 2024 21:10)  T(F): 98.2 (01 Jun 2024 05:45), Max: 98.9 (31 May 2024 21:10)  HR: 98 (01 Jun 2024 05:45) (95 - 104)  BP: 110/60 (01 Jun 2024 05:45) (110/60 - 156/72)  BP(mean): --  RR: 18 (01 Jun 2024 05:45) (18 - 18)  SpO2: 93% (01 Jun 2024 05:45) (93% - 95%)    Parameters below as of 01 Jun 2024 05:45  Patient On (Oxygen Delivery Method): room air      Daily     Daily I&O's Summary      GENERAL:  Appears stated age, well-groomed, well-nourished, no distress  HEENT:  NC/AT,  conjunctivae clear and pink, no thyromegaly, nodules, adenopathy, no JVD, sclera -anicteric  CHEST:  Full & symmetric excursion, no increased effort, breath sounds clear  HEART:  Regular rhythm, S1, S2, no murmur/rub/S3/S4, no abdominal bruit, no edema  ABDOMEN:  Soft, non-tender, non-distended, normoactive bowel sounds,  no masses ,no hepato-splenomegaly, no signs of chronic liver disease  EXTEREMITIES:  no cyanosis,clubbing or edema  SKIN:  No rash/erythema/ecchymoses/petechiae/wounds/abscess/warm/dry  NEURO:  Alert, oriented, no asterixis, no tremor, no encephalopathy      LABS:              amylase   lipase  RADIOLOGY & ADDITIONAL TESTS:  
INTERVAL HPI/OVERNIGHT EVENTS:  HPI:  No new overnight event.  No N/V/D.  Tolerating diet.     MEDICATIONS  (STANDING):  donepezil 10 milliGRAM(s) Oral at bedtime  lactated ringers. 1000 milliLiter(s) (75 mL/Hr) IV Continuous <Continuous>  lactulose Syrup 30 Gram(s) Oral daily  pantoprazole    Tablet 40 milliGRAM(s) Oral before breakfast  piperacillin/tazobactam IVPB.. 3.375 Gram(s) IV Intermittent every 12 hours  polyethylene glycol 3350 17 Gram(s) Oral daily  senna 2 Tablet(s) Oral at bedtime    MEDICATIONS  (PRN):  bisacodyl Suppository 10 milliGRAM(s) Rectal daily PRN Constipation      Allergies    No Known Allergies    Intolerances      General:  No wt loss, fevers, chills, night sweats, fatigue,   Eyes:  Good vision, no reported pain  ENT:  No sore throat, pain, runny nose, dysphagia  CV:  No pain, palpitations, hypo/hypertension  Resp:  No dyspnea, cough, tachypnea, wheezing  GI:  No pain, No nausea, No vomiting, No diarrhea, No constipation, No weight loss, No fever, No pruritis, No rectal bleeding, No tarry stools, No dysphagia,  :  No pain, bleeding, incontinence, nocturia  Muscle:  No pain, weakness  Neuro:  No weakness, tingling, memory problems  Psych:  No fatigue, insomnia, mood problems, depression  Endocrine:  No polyuria, polydipsia, cold/heat intolerance  Heme:  No petechiae, ecchymosis, easy bruisability  Skin:  No rash, tattoos, scars, edema      PHYSICAL EXAM:   Vital Signs:  Vital Signs Last 24 Hrs  T(C): 36.8 (30 May 2024 05:22), Max: 37.2 (29 May 2024 20:59)  T(F): 98.2 (30 May 2024 05:22), Max: 99 (29 May 2024 20:59)  HR: 106 (30 May 2024 05:22) (85 - 106)  BP: 151/83 (30 May 2024 05:22) (138/81 - 151/83)  BP(mean): --  RR: 17 (30 May 2024 05:22) (17 - 18)  SpO2: 91% (30 May 2024 05:22) (91% - 95%)    Parameters below as of 30 May 2024 05:22  Patient On (Oxygen Delivery Method): room air      Daily Height in cm: 165.1 (30 May 2024 08:57)    Daily Weight in k.2 (29 May 2024 20:59)I&O's Summary    29 May 2024 07:01  -  30 May 2024 07:00  --------------------------------------------------------  IN: 475 mL / OUT: 0 mL / NET: 475 mL        GENERAL:  Appears stated age, well-groomed, well-nourished, no distress  HEENT:  NC/AT,  conjunctivae clear and pink, no thyromegaly, nodules, adenopathy, no JVD, sclera -anicteric  CHEST:  Full & symmetric excursion, no increased effort, breath sounds clear  HEART:  Regular rhythm, S1, S2, no murmur/rub/S3/S4, no abdominal bruit, no edema  ABDOMEN:  Soft, non-tender, non-distended, normoactive bowel sounds,  no masses ,no hepato-splenomegaly, no signs of chronic liver disease  EXTEREMITIES:  no cyanosis,clubbing or edema  SKIN:  No rash/erythema/ecchymoses/petechiae/wounds/abscess/warm/dry  NEURO:  Alert, oriented, no asterixis, no tremor, no encephalopathy      LABS:                        12.6   17.19 )-----------( 355      ( 29 May 2024 08:30 )             40.4         135  |  98  |  44<H>  ----------------------------<  158<H>  6.0<H>   |  26  |  1.72<H>    Ca    9.7      29 May 2024 08:30    TPro  7.0  /  Alb  2.3<L>  /  TBili  0.9  /  DBili  x   /  AST  63<H>  /  ALT  28  /  AlkPhos  81      PT/INR - ( 29 May 2024 08:30 )   PT: 11.5 sec;   INR: 1.06 ratio         PTT - ( 29 May 2024 08:30 )  PTT:26.5 sec  Urinalysis Basic - ( 29 May 2024 08:50 )    Color: Red / Appearance: Turbid / S.031 / pH: x  Gluc: x / Ketone: see note mg/dL  / Bili: Small / Urobili: 0.2 mg/dL   Blood: x / Protein: 30 mg/dL / Nitrite: Positive   Leuk Esterase: Large / RBC: Too Numerous to count /HPF / WBC 10 /HPF   Sq Epi: x / Non Sq Epi: x / Bacteria: Many /HPF      amylase   lipase  RADIOLOGY & ADDITIONAL TESTS:

## 2024-06-01 NOTE — DISCHARGE NOTE PROVIDER - HOSPITAL COURSE
HPI:  Patient seen and examined last night   89 year old female with a history of HTN, HL, dementia who presents with rectal bleeding. The patient is unable to provide additional history. She was noted to have rectal bleeding at NH.       89Y[facility] F resident of [facility] Dementia HTN, HLD admitted with GIB and UTI  In ED Tmax 100.2 WBC 17K with bandemia. Noted mohamud. Ua with pyuria,   Made comfort care       [facility] consulted   No intervention   Family opted Hospice care   UTI off abx     Comfort care

## 2024-06-03 LAB
CULTURE RESULTS: SIGNIFICANT CHANGE UP
CULTURE RESULTS: SIGNIFICANT CHANGE UP
SPECIMEN SOURCE: SIGNIFICANT CHANGE UP
SPECIMEN SOURCE: SIGNIFICANT CHANGE UP

## 2024-06-24 ENCOUNTER — TRANSCRIPTION ENCOUNTER (OUTPATIENT)
Age: 89
End: 2024-06-24

## 2024-07-01 ENCOUNTER — TRANSCRIPTION ENCOUNTER (OUTPATIENT)
Age: 89
End: 2024-07-01